# Patient Record
Sex: FEMALE | Race: WHITE | NOT HISPANIC OR LATINO | Employment: UNEMPLOYED | URBAN - METROPOLITAN AREA
[De-identification: names, ages, dates, MRNs, and addresses within clinical notes are randomized per-mention and may not be internally consistent; named-entity substitution may affect disease eponyms.]

---

## 2017-01-16 ENCOUNTER — OFFICE VISIT (OUTPATIENT)
Dept: MIDWIFE SERVICES | Facility: CLINIC | Age: 42
End: 2017-01-16
Payer: COMMERCIAL

## 2017-01-16 VITALS
OXYGEN SATURATION: 99 % | WEIGHT: 188.1 LBS | DIASTOLIC BLOOD PRESSURE: 82 MMHG | HEART RATE: 88 BPM | TEMPERATURE: 97.3 F | BODY MASS INDEX: 30.23 KG/M2 | HEIGHT: 66 IN | SYSTOLIC BLOOD PRESSURE: 118 MMHG

## 2017-01-16 DIAGNOSIS — E03.4 HYPOTHYROIDISM DUE TO ACQUIRED ATROPHY OF THYROID: ICD-10-CM

## 2017-01-16 DIAGNOSIS — Z01.419 ENCOUNTER FOR GYNECOLOGICAL EXAMINATION WITHOUT ABNORMAL FINDING: Primary | ICD-10-CM

## 2017-01-16 DIAGNOSIS — Z12.31 ENCOUNTER FOR SCREENING MAMMOGRAM FOR BREAST CANCER: ICD-10-CM

## 2017-01-16 PROCEDURE — 83718 ASSAY OF LIPOPROTEIN: CPT | Performed by: ADVANCED PRACTICE MIDWIFE

## 2017-01-16 PROCEDURE — 87624 HPV HI-RISK TYP POOLED RSLT: CPT | Performed by: ADVANCED PRACTICE MIDWIFE

## 2017-01-16 PROCEDURE — 82465 ASSAY BLD/SERUM CHOLESTEROL: CPT | Performed by: ADVANCED PRACTICE MIDWIFE

## 2017-01-16 PROCEDURE — 99396 PREV VISIT EST AGE 40-64: CPT | Performed by: ADVANCED PRACTICE MIDWIFE

## 2017-01-16 PROCEDURE — 82306 VITAMIN D 25 HYDROXY: CPT | Performed by: ADVANCED PRACTICE MIDWIFE

## 2017-01-16 PROCEDURE — G0145 SCR C/V CYTO,THINLAYER,RESCR: HCPCS | Performed by: ADVANCED PRACTICE MIDWIFE

## 2017-01-16 PROCEDURE — 36415 COLL VENOUS BLD VENIPUNCTURE: CPT | Performed by: ADVANCED PRACTICE MIDWIFE

## 2017-01-16 RX ORDER — LEVOTHYROXINE SODIUM 150 UG/1
150 TABLET ORAL DAILY
Qty: 90 TABLET | Refills: 3 | Status: SHIPPED | OUTPATIENT
Start: 2017-01-16 | End: 2017-12-19

## 2017-01-16 NOTE — NURSING NOTE
"Chief Complaint   Patient presents with     Physical       Initial /82 mmHg  Pulse 88  Temp(Src) 97.3  F (36.3  C) (Oral)  Ht 5' 5.5\" (1.664 m)  Wt 188 lb 1.6 oz (85.322 kg)  BMI 30.81 kg/m2  SpO2 99%  LMP 2017  Breastfeeding? No Estimated body mass index is 30.81 kg/(m^2) as calculated from the following:    Height as of this encounter: 5' 5.5\" (1.664 m).    Weight as of this encounter: 188 lb 1.6 oz (85.322 kg).  BP completed using cuff size: regular        The following HM Due: mammogram  pap smear      The following patient reported/Care Every where data was sent to:  P ABSTRACT QUALITY INITIATIVES [68009]  n/a     patient has appointment for today and orders have been placed               "

## 2017-01-16 NOTE — Clinical Note
[place] [id number] [place] [address]  [phone]      January 24, 2017    Mariah Perera  [facility] [place] [address]    Dear Mariah,  We are happy to inform you that your PAP smear result from 1/16/17 is normal.  We are now able to do a follow up test on PAP smears. The DNA test is for HPV (Human Papilloma Virus). Cervical cancer is closely linked with certain types of HPV. Your result showed no evidence of high risk HPV.  Therefore we recommend you return in 3 years for your next pap smear and HPV test.  You will still need to return to the clinic every year for an annual exam and other preventive tests.  Please contact the clinic with any questions.  Sincerely,  DICKSON Orellana CNM/hung

## 2017-01-16 NOTE — PROGRESS NOTES
"Mariah is a 41 year old  female who presents for annual exam.     Menses are {GYN PERIOD REGULARITY:715} and {MENSES DESCRIPTION:944328} lasting {1-10:209671} days.  Menses flow: {MENSTRUAL FLOW:0279823::\"normal\"}.  No LMP recorded. Patient is not currently having periods (Reason: Breast Feeding).. Using {CONTRACEPTION:847900} for contraception.  She {IS NOT/IS:467021::\"is not\"} currently considering pregnancy.  Besides routine health maintenance, {NO OTHER:136443}.  GYNECOLOGIC HISTORY:  Menarche: ***  {AGE AT FIRST/LIFETIME PARTNERS:380853}  Mariah {isnot:726886::\"is\"} sexually active with ***male partner(s) and {isnot:770013::\"is\"} currently in monogamous relationship with ***.    History sexually transmitted infections:{STD AGENTS:476652::\"No STD history\"}  STI testing offered?  {GC/CHLAMYDIA:757378}  ARABELLA exposure: {YES/NO/UNKNOWN:240933}  History of abnormal Pap smear: {PAP HX:492622}  Family history of breast CA: {YES/NO:288014::\"No\"}  Family history of uterine/ovarian CA: {YES/NO:421481::\"No\"}    Family history of colon CA: {YES/NO:383187::\"No\"}    HEALTH MAINTENANCE:  Cholesterol: (  CHOLESTEROL   Date Value Ref Range Status   2012 190 0 - 200 mg/dL Final     Comment:     LDL Cholesterol is the primary guide to therapy.   The NCEP recommends further evaluation of: patients with cholesterol greater   than 200 mg/dL if additional risk factors are present, cholesterol greater   than   240 mg/dL, triglycerides greater than 150 mg/dL, or HDL less than 40 mg/dL.   2011 163 0 - 200 mg/dL Final     Comment:     LDL Cholesterol is the primary guide to therapy.   The NCEP recommends further evaluation of: patients with cholesterol <200   mg/dL   if additional risk factors are present, cholesterol >240 mg/dL, triglycerides   >150 mg/dL, or HDL <40 mg/dL.    History of abnormal lipids: {Yes/No:175990250}  Mammo: *** . History of abnormal Mammo: {YES CAPS/NO SMALL:240795::\"***\"}.  Regular Self Breast " Exams: {Yes/No:800196658}  Calcium/Vitamin D intake: source:  {CALCIUM SOURCES:313193} Adequate? {Yes/No:616604428}  TSH: (  TSH   Date Value Ref Range Status   2016 1.62 0.40 - 4.00 mU/L Final    )  Pap; (PAP      NIL   2013  PAP      NIL   2012  PAP      NIL   2011 )    HISTORY:  Obstetric History       T2      TAB0   SAB0   E0   M0   L2       # Outcome Date GA Lbr Curry/2nd Weight Sex Delivery Anes PTL Lv   2 Term 10/11/13 42w1d 01:45 / 00:28 10 lb (4.536 kg) M Vag-Spont Local N Y      Name: Manav Palomo      Apgar1:  7                Apgar5: 9   1 Term 06 41w0d  9 lb 9 oz (4.338 kg) M    Y      Name: Albert        Past Medical History   Diagnosis Date     Hypothyroid      Papanicolaou smear of cervix with low grade squamous intraepithelial lesion (LGSIL) , '10     colp JOSE I, NIL     Past Surgical History   Procedure Laterality Date     C nonspecific procedure       removal of cyst from foot     Hsg  2010     normal     Radioactive iodine       Family History   Problem Relation Age of Onset     C.A.D. Paternal Grandfather      MI     Hypertension Maternal Grandfather      CEREBROVASCULAR DISEASE Maternal Grandfather      C.A.D. Maternal Grandfather      MI     Hypertension Mother      Genitourinary Problems Mother      Kidney stones     Genitourinary Problems Sister      kidney stones     DIABETES Maternal Grandmother      DIABETES Paternal Grandfather      Social History     Social History     Marital Status:      Spouse Name: N/A     Number of Children: 1     Years of Education: N/A     Occupational History      None      Social History Main Topics     Smoking status: Never Smoker      Smokeless tobacco: Never Used     Alcohol Use: No      Comment: very rarely     Drug Use: No     Sexual Activity:     Partners: Male     Other Topics Concern     Parent/Sibling W/ Cabg, Mi Or Angioplasty Before 65f 55m? No     Social History Narrative    Caffeine  intake/servings daily - 1-2 of tea/day    Calcium intake/servings daily - 2    Exercise 4 times weekly - describe yoga, pilates    Sunscreen used - Yes    Seatbelts used - Yes    Guns stored in the home - No    Self Breast Exam - Yes    Pap test up to date -  Yes    Eye exam up to date -  No    Dental exam up to date -  Yes    DEXA scan up to date -  Not Applicable    Flex Sig/Colonoscopy up to date -  Not Applicable    Mammography up to date -  Not Applicable    Immunizations reviewed and up to date - Yes    Abuse: Current or Past (Physical, Sexual or Emotional) - No    Do you feel safe in your environment - Yes    Do you cope well with stress - Yes    Do you suffer from insomnia - Yes    Last updated by: RIKKI GARCIA  2/11/2013                               Current outpatient prescriptions:      levothyroxine (SYNTHROID, LEVOTHROID) 150 MCG tablet, Take 1 tablet (150 mcg) by mouth daily, Disp: 90 tablet, Rfl: 1     PRENATAL VITAMINS PO, Take 1 tablet by mouth daily., Disp: , Rfl:      Allergies   Allergen Reactions     Methimazole Itching       Past medical, surgical, social and family history were reviewed and updated in EPIC.    ROS:   C:     NEGATIVE for fever, chills, change in weight  I:       NEGATIVE for worrisome rashes, moles or lesions  E:     NEGATIVE for vision changes or irritation  E/M: NEGATIVE for ear, mouth and throat problems  R:     NEGATIVE for significant cough or SOB  CV:   NEGATIVE for chest pain, palpitations or peripheral edema  GI:     NEGATIVE for nausea, abdominal pain, heartburn, or change in bowel habits  :   NEGATIVE for frequency, dysuria, hematuria, vaginal discharge, or irregular bleeding  M:     NEGATIVE for significant arthralgias or myalgia  N:      NEGATIVE for weakness, dizziness or paresthesias  E:      NEGATIVE for temperature intolerance, skin/hair changes  P:      NEGATIVE for changes in mood or affect.    EXAM:  There were no vitals taken for this visit.   BMI:  "There is no weight on file to calculate BMI.  Constitutional: healthy, alert and no distress  Head: Normocephalic. No masses, lesions, tenderness or abnormalities  Neck: Neck supple. Trachea midline. No adenopathy. Thyroid symmetric, normal size.   Cardiovascular: RRR.   Respiratory: Negative.   Breast: {GYN Breast:974851}  Gastrointestinal: Abdomen soft, non-tender, non-distended. No masses, organomegaly.  :  Vulva:  No external lesions, normal female hair distribution, no inguinal adenopathy.    Urethra:  Midline, non-tender, well supported, no discharge  Vagina:  Moist, pink, no abnormal discharge, no lesions  Uterus:  Normal size, *** , non-tender, freely mobile  Ovaries:  No masses appreciated, non-tender, mobile  Rectal Exam: {RECTAL EXAM:773295::\"deferred\"}  Musculoskeletal: extremities normal  Skin: no suspicious lesions or rashes  Psychiatric: Affect appropriate, cooperative,mentation appears normal.     COUNSELING:   {FEMALE COUNSELING MESSAGES:489053::\"Reviewed preventive health counseling, as reflected in patient instructions\"}   reports that she has never smoked. She has never used smokeless tobacco.  {Tobacco Cessation -- Delete if patient is a non-smoker:482849}  There is no weight on file to calculate BMI.  {Obesity Action Plan -- Delete if BMA < 25:710517}  FRAX Risk Assessment    ASSESSMENT:  41 year old female with satisfactory annual exam  {DIAG PICKLIST:478717}      "

## 2017-01-16 NOTE — PROGRESS NOTES
Mariah is a 41 year old  female who presents for annual exam.  Has 3 y/o and 10 y/o  to a , is a homemaker, they met and travel to [facility] often.    Menses are regular q 28-30 days and normal and crampy lasting 4 days.  Menses flow: medium, heavy and with clots.  Patient's last menstrual period was 2017.. Using vasectomy for contraception.  She is not currently considering pregnancy.  Besides routine health maintenance,  she would like to discuss supplements, mammo questions.  GYNECOLOGIC HISTORY:  Menarche: 13  Age at first intercourse: 18 Number of lifetime partners: less than 6  Mariah is sexually active with male partner(s) and is currently in monogamous relationship with .    History sexually transmitted infections:Herpes  STI testing offered?  Declined  ARABELLA exposure: No  History of abnormal Pap smear: YES - updated in Problem List and Health Maintenance accordingly  Family history of breast CA: No  Family history of uterine/ovarian CA: No    Family history of colon CA: No    HEALTH MAINTENANCE:  Cholesterol: (  CHOLESTEROL   Date Value Ref Range Status   2012 190 0 - 200 mg/dL Final     Comment:     LDL Cholesterol is the primary guide to therapy.   The NCEP recommends further evaluation of: patients with cholesterol greater   than 200 mg/dL if additional risk factors are present, cholesterol greater   than   240 mg/dL, triglycerides greater than 150 mg/dL, or HDL less than 40 mg/dL.   2011 163 0 - 200 mg/dL Final     Comment:     LDL Cholesterol is the primary guide to therapy.   The NCEP recommends further evaluation of: patients with cholesterol <200   mg/dL   if additional risk factors are present, cholesterol >240 mg/dL, triglycerides   >150 mg/dL, or HDL <40 mg/dL.    History of abnormal lipids: No  Mammo: n/a . History of abnormal Mammo: Not applicable.  Regular Self Breast Exams: Yes  Calcium/Vitamin D intake: source:  dairy, green leafy veggies Adequate?  No  TSH: (  TSH   Date Value Ref Range Status   2016 1.62 0.40 - 4.00 mU/L Final    )  Pap; (PAP      NIL   2013  PAP      NIL   2012  PAP      NIL   2011 )    HISTORY:  Obstetric History       T2      TAB0   SAB0   E0   M0   L2       # Outcome Date GA Lbr Curry/2nd Weight Sex Delivery Anes PTL Lv   2 Term 10/11/13 42w1d 01:45 / 00:28 10 lb (4.536 kg) M Vag-Spont Local N Y      Name: Manav Palomo      Apgar1:  7                Apgar5: 9   1 Term 06 41w0d  9 lb 9 oz (4.338 kg) M    Y      Name: Albert        Past Medical History   Diagnosis Date     Hypothyroid      Papanicolaou smear of cervix with low grade squamous intraepithelial lesion (LGSIL) , '10     colp JOSE I, NIL     Past Surgical History   Procedure Laterality Date     C nonspecific procedure       removal of cyst from foot     Hsg  2010     normal     Radioactive iodine       Family History   Problem Relation Age of Onset     C.A.D. Paternal Grandfather      MI     Hypertension Maternal Grandfather      CEREBROVASCULAR DISEASE Maternal Grandfather      C.A.D. Maternal Grandfather      MI     Hypertension Mother      Genitourinary Problems Mother      Kidney stones     Genitourinary Problems Sister      kidney stones     DIABETES Maternal Grandmother      DIABETES Paternal Grandfather      Coronary Artery Disease Mother      3 stints placed     Genitourinary Problems Father      Social History     Social History     Marital Status:      Spouse Name: N/A     Number of Children: 1     Years of Education: N/A     Occupational History      None      Social History Main Topics     Smoking status: Never Smoker      Smokeless tobacco: Never Used     Alcohol Use: 0.0 oz/week     0 Standard drinks or equivalent per week      Comment: very rarely     Drug Use: No     Sexual Activity:     Partners: Male     Birth Control/ Protection: Male Surgical     Other Topics Concern     Parent/Sibling W/ Cabg, Mi  Or Angioplasty Before 65f 55m? No     Social History Narrative    Caffeine intake/servings daily - 1-2 of tea/day    Calcium intake/servings daily - 2    Exercise 4 times weekly - describe yoga, pilates    Sunscreen used - Yes    Seatbelts used - Yes    Guns stored in the home - No    Self Breast Exam - Yes    Pap test up to date -  Yes    Eye exam up to date -  No    Dental exam up to date -  Yes    DEXA scan up to date -  Not Applicable    Flex Sig/Colonoscopy up to date -  Not Applicable    Mammography up to date -  Not Applicable    Immunizations reviewed and up to date - Yes    Abuse: Current or Past (Physical, Sexual or Emotional) - No    Do you feel safe in your environment - Yes    Do you cope well with stress - Yes    Do you suffer from insomnia - Yes    Last updated by: RIKKI GARCIA  2/11/2013                               Current outpatient prescriptions:      levothyroxine (SYNTHROID, LEVOTHROID) 150 MCG tablet, Take 1 tablet (150 mcg) by mouth daily, Disp: 90 tablet, Rfl: 1     Allergies   Allergen Reactions     Methimazole Itching       Past medical, surgical, social and family history were reviewed and updated in EPIC.    ROS:   C:     NEGATIVE for fever, chills, change in weight  I:       NEGATIVE for worrisome rashes, moles or lesions  E:     NEGATIVE for vision changes or irritation  E/M: NEGATIVE for ear, mouth and throat problems  R:     NEGATIVE for significant cough or SOB  CV:   NEGATIVE for chest pain, palpitations or peripheral edema  GI:     NEGATIVE for nausea, abdominal pain, heartburn, or change in bowel habits  :   NEGATIVE for frequency, dysuria, hematuria, vaginal discharge, or irregular bleeding  M:     NEGATIVE for significant arthralgias or myalgia  N:      NEGATIVE for weakness, dizziness or paresthesias  E:      NEGATIVE for temperature intolerance, skin/hair changes  P:      NEGATIVE for changes in mood or affect.    EXAM:  /82 mmHg  Pulse 88  Temp(Src) 97.3  F  "(36.3  C) (Oral)  Ht 5' 5.5\" (1.664 m)  Wt 188 lb 1.6 oz (85.322 kg)  BMI 30.81 kg/m2  SpO2 99%  LMP 01/07/2017  Breastfeeding? No   BMI: Body mass index is 30.81 kg/(m^2).  Constitutional: healthy, alert and no distress  Head: Normocephalic. No masses, lesions, tenderness or abnormalities  Neck: Neck supple. Trachea midline. No adenopathy. Thyroid symmetric, normal size.   Cardiovascular: RRR.   Respiratory: Negative.   Breast: Breasts reveal mild symmetric fibrocystic densities, but there are no dominant, discrete, fixed or suspicious masses found.  Gastrointestinal: Abdomen soft, non-tender, non-distended. No masses, organomegaly.  :  Vulva:  No external lesions, normal female hair distribution, no inguinal adenopathy.    Urethra:  Midline, non-tender, well supported, no discharge  Vagina:  Moist, pink, no abnormal discharge, no lesions  Uterus:  Normal size, antverted , non-tender, freely mobile  Ovaries:  No masses appreciated, non-tender, mobile  Rectal Exam: deferred  Musculoskeletal: extremities normal  Skin: no suspicious lesions or rashes  Psychiatric: Affect appropriate, cooperative,mentation appears normal.     COUNSELING:   Reviewed preventive health counseling, as reflected in patient instructions       mindfulness       Regular exercise       Healthy diet/nutrition   reports that she has never smoked. She has never used smokeless tobacco.    Body mass index is 30.81 kg/(m^2).    FRAX Risk Assessment    ASSESSMENT:  41 year old female with satisfactory annual exam  (Z01.419) Encounter for gynecological examination without abnormal finding  (primary encounter diagnosis)  Comment:   Plan: HPV High Risk Types DNA Cervical, Pap imaged         thin layer screen with HPV - recommended age 30        - 65 years (select HPV order below), Vitamin D         Deficiency, Cholesterol, HDL cholesterol            (Z12.31) Encounter for screening mammogram for breast cancer  Comment:   Plan: MA Screening " Digital Bilateral            (E03.4) Hypothyroidism due to acquired atrophy of thyroid  Comment:   Plan: levothyroxine (SYNTHROID/LEVOTHROID) 150 MCG         tablet            Just stopped breastfeeding, advised mammo in 6 months.  Had thyroid ablated, taking 150 mgc synthroid daily, would advise without symptoms to have checked every year, otherwise with symptoms.  Had checked in Dec 2016 and was wnl.      RTC 1 yr for annual, sooner with concerns.    Becky Alarcon CNM

## 2017-01-17 LAB
CHOLEST SERPL-MCNC: 185 MG/DL
DEPRECATED CALCIDIOL+CALCIFEROL SERPL-MC: 20 UG/L (ref 20–75)
HDLC SERPL-MCNC: 59 MG/DL

## 2017-01-18 LAB
COPATH REPORT: NORMAL
PAP: NORMAL

## 2017-01-20 LAB
FINAL DIAGNOSIS: NORMAL
HPV HR 12 DNA CVX QL NAA+PROBE: NEGATIVE
HPV16 DNA SPEC QL NAA+PROBE: NEGATIVE
HPV18 DNA SPEC QL NAA+PROBE: NEGATIVE
SPECIMEN DESCRIPTION: NORMAL

## 2017-07-07 ENCOUNTER — TELEPHONE (OUTPATIENT)
Dept: MIDWIFE SERVICES | Facility: CLINIC | Age: 42
End: 2017-07-07

## 2017-07-07 DIAGNOSIS — N64.4 BREAST PAIN, LEFT: Primary | ICD-10-CM

## 2017-07-07 NOTE — TELEPHONE ENCOUNTER
Patient calling regarding order for mammogram. Call to schedule, but did note left sided breast pain so needs diagnostic mammogram order. I have it teed up.Please sign.     Of note, patient does not have any lumps, bumps, redness or nipple discharge.  Mirela Ordaz

## 2017-11-01 ENCOUNTER — ALLIED HEALTH/NURSE VISIT (OUTPATIENT)
Dept: NURSING | Facility: CLINIC | Age: 42
End: 2017-11-01
Payer: COMMERCIAL

## 2017-11-01 DIAGNOSIS — Z23 NEED FOR PROPHYLACTIC VACCINATION AND INOCULATION AGAINST INFLUENZA: Primary | ICD-10-CM

## 2017-11-01 PROCEDURE — 90686 IIV4 VACC NO PRSV 0.5 ML IM: CPT

## 2017-11-01 PROCEDURE — 90471 IMMUNIZATION ADMIN: CPT

## 2017-11-01 PROCEDURE — 99207 ZZC NO CHARGE NURSE ONLY: CPT

## 2017-11-01 NOTE — PROGRESS NOTES

## 2017-11-01 NOTE — MR AVS SNAPSHOT
After Visit Summary   11/1/2017    Mariah Perera    MRN: [id number]           Patient Information     Date Of Birth          1975        Visit Information        Provider Department      11/1/2017 11:00 AM [facility] MEDICAL ASSISTANT [facility]        Today's Diagnoses     Need for prophylactic vaccination and inoculation against influenza    -  1       Follow-ups after your visit        Who to contact     If you have questions or need follow up information about today's clinic visit or your schedule please contact [facility] directly at [phone].  Normal or non-critical lab and imaging results will be communicated to you by ????hart, letter or phone within 4 business days after the clinic has received the results. If you do not hear from us within 7 days, please contact the clinic through [facility] or phone. If you have a critical or abnormal lab result, we will notify you by phone as soon as possible.  Submit refill requests through [facility] or call your pharmacy and they will forward the refill request to us. Please allow 3 business days for your refill to be completed.          Additional Information About Your Visit        MyChart Information     [facility] gives you secure access to your electronic health record. If you see a primary care provider, you can also send messages to your care team and make appointments. If you have questions, please call your primary care clinic.  If you do not have a primary care provider, please call [phone] and they will assist you.        Care EveryWhere ID     This is your Care EveryWhere ID. This could be used by other organizations to access your [facility] medical records  [id number]         Blood Pressure from Last 3 Encounters:   01/16/17 118/82   12/01/15 106/62   03/02/15 115/70    Weight from Last 3 Encounters:   01/16/17 188 lb 1.6 oz (85.3 kg)   12/01/15 179 lb (81.2 kg)   03/02/15 171 lb 11.2 oz (77.9  kg)              We Performed the Following     FLU VAC, SPLIT VIRUS IM > 3 YO (QUADRIVALENT) [42311]     Vaccine Administration, Initial [[id number]]        Primary Care Provider Office Phone # Fax #    Liban Cunningham Thien Zhou [phone] [address]  [facility] [address]        Equal Access to Services     [facility]: Hadii aad ku hadasho Soomaali, waaxda luqadaha, qaybta kaalmada adeegyada, waxay idiin hayaan adeeg kharajody lamauricion . So [facility] [phone].    ATENCIÓN: Si habla español, tiene a canela disposición servicios gratuitos de asistencia lingüística. Rochelle al [phone].    We comply with applicable federal civil rights laws and [place] laws. We do not discriminate on the basis of race, color, national origin, age, disability, sex, sexual orientation, or gender identity.            Thank you!     Thank you for choosing [facility]  for your care. Our goal is always to provide you with excellent care. Hearing back from our patients is one way we can continue to improve our services. Please take a few minutes to complete the written survey that you may receive in the mail after your visit with us. Thank you!             Your Updated Medication List - Protect others around you: Learn how to safely use, store and throw away your medicines at www.disposemymeds.org.          This list is accurate as of: 11/1/17 12:03 PM.  Always use your most recent med list.                   Brand Name Dispense Instructions for use Diagnosis    levothyroxine 150 MCG tablet    SYNTHROID/LEVOTHROID    90 tablet    Take 1 tablet (150 mcg) by mouth daily    Hypothyroidism due to acquired atrophy of thyroid

## 2017-12-19 DIAGNOSIS — E03.4 HYPOTHYROIDISM DUE TO ACQUIRED ATROPHY OF THYROID: ICD-10-CM

## 2017-12-19 RX ORDER — LEVOTHYROXINE SODIUM 150 UG/1
150 TABLET ORAL DAILY
Qty: 90 TABLET | Refills: 0 | Status: SHIPPED | OUTPATIENT
Start: 2017-12-19 | End: 2018-03-14

## 2018-03-14 ENCOUNTER — OFFICE VISIT (OUTPATIENT)
Dept: MIDWIFE SERVICES | Facility: CLINIC | Age: 43
End: 2018-03-14
Payer: COMMERCIAL

## 2018-03-14 VITALS
HEIGHT: 66 IN | SYSTOLIC BLOOD PRESSURE: 128 MMHG | BODY MASS INDEX: 29.36 KG/M2 | OXYGEN SATURATION: 99 % | DIASTOLIC BLOOD PRESSURE: 76 MMHG | HEART RATE: 88 BPM | TEMPERATURE: 97.5 F | WEIGHT: 182.7 LBS

## 2018-03-14 DIAGNOSIS — E03.4 HYPOTHYROIDISM DUE TO ACQUIRED ATROPHY OF THYROID: ICD-10-CM

## 2018-03-14 DIAGNOSIS — Z01.419 ENCOUNTER FOR WELL WOMAN EXAM WITH ROUTINE GYNECOLOGICAL EXAM: Primary | ICD-10-CM

## 2018-03-14 PROCEDURE — 36415 COLL VENOUS BLD VENIPUNCTURE: CPT | Performed by: ADVANCED PRACTICE MIDWIFE

## 2018-03-14 PROCEDURE — 84443 ASSAY THYROID STIM HORMONE: CPT | Performed by: ADVANCED PRACTICE MIDWIFE

## 2018-03-14 PROCEDURE — 99396 PREV VISIT EST AGE 40-64: CPT | Performed by: ADVANCED PRACTICE MIDWIFE

## 2018-03-14 RX ORDER — LEVOTHYROXINE SODIUM 150 UG/1
150 TABLET ORAL DAILY
Qty: 90 TABLET | Refills: 3 | Status: SHIPPED | OUTPATIENT
Start: 2018-03-14 | End: 2018-03-23

## 2018-03-14 NOTE — PROGRESS NOTES
"Chief Complaint   Patient presents with     Physical       Initial /76 (BP Location: Left arm, Patient Position: Sitting, Cuff Size: Adult Regular)  Pulse 88  Temp 97.5  F (36.4  C) (Oral)  Ht 5' 5.75\" (1.67 m)  Wt 182 lb 11.2 oz (82.9 kg)  LMP 2018 (Approximate)  SpO2 99%  Breastfeeding? No  BMI 29.71 kg/m2 Estimated body mass index is 29.71 kg/(m^2) as calculated from the following:    Height as of this encounter: 5' 5.75\" (1.67 m).    Weight as of this encounter: 182 lb 11.2 oz (82.9 kg).  BP completed using cuff size: regular        The following HM Due: NONE      The following patient reported/Care Every where data was sent to:  P ABSTRACT QUALITY INITIATIVES [73543]  n/a      n/a and patient has appointment for today            Mariah is a 43 year old  female who presents for annual exam.     Menses are regular q 28-30 days and breast tenderness prior lasting 5 days.  Menses flow: light, heavy and with clots.  Patient's last menstrual period was 2018 (approximate).. Using vasectomy for contraception.  She is not currently considering pregnancy.  Besides routine health maintenance,  she would like to discuss refills, spot on stomach that feels like a hole in muscle wall, and check a mole.  GYNECOLOGIC HISTORY:  Menarche: 13  Age at first intercourse: 18 Number of lifetime partners: less than 6  Mariah is sexually active with male partner(s) and is currently in monogamous relationship with .    History sexually transmitted infections: Herpes  STI testing offered?  Declined  ARABELLA exposure: No  History of abnormal Pap smear: YES - updated in Problem List and Health Maintenance accordingly  Family history of breast CA: No  Family history of uterine/ovarian CA: No    Family history of colon CA: No    HEALTH MAINTENANCE:  Cholesterol: (  Cholesterol   Date Value Ref Range Status   2017 185 <200 mg/dL Final   2012 190 0 - 200 mg/dL Final     Comment:     LDL " Cholesterol is the primary guide to therapy.   The NCEP recommends further evaluation of: patients with cholesterol greater   than 200 mg/dL if additional risk factors are present, cholesterol greater   than   240 mg/dL, triglycerides greater than 150 mg/dL, or HDL less than 40 mg/dL.    History of abnormal lipids: No  Mammo: 2017 . History of abnormal Mammo: No.  Regular Self Breast Exams: Yes  Calcium/Vitamin D intake: source:  dairy, green leafy veggies Adequate? No  TSH: (  TSH   Date Value Ref Range Status   2016 1.62 0.40 - 4.00 mU/L Final    )  Pap; (  Lab Results   Component Value Date    PAP NIL 2017    PAP NIL 2013    PAP NIL 2012    )    HISTORY:  Obstetric History       T2      L2     SAB0   TAB0   Ectopic0   Multiple0   Live Births2       # Outcome Date GA Lbr Curry/2nd Weight Sex Delivery Anes PTL Lv   2 Term 10/11/13 42w1d 01:45 / 00:28 10 lb (4.536 kg) M Vag-Spont Local N JACEK      Name: Manav Palomo      Apgar1:  7                Apgar5: 9   1 Term 06 41w0d  9 lb 9 oz (4.338 kg) M    JACEK      Name: Albert        Past Medical History:   Diagnosis Date     Hx of colposcopy with cervical biopsy ,     Hypothyroid      Papanicolaou smear of cervix with low grade squamous intraepithelial lesion (LGSIL) '09, '10    colp JOSE I, NIL     Past Surgical History:   Procedure Laterality Date     C NONSPECIFIC PROCEDURE      removal of cyst from foot     HSG  2010    normal     radioactive iodine       Family History   Problem Relation Age of Onset     Hypertension Mother      Genitourinary Problems Mother      Kidney stones     Coronary Artery Disease Mother      3 stints placed     Depression Mother      CEREBROVASCULAR DISEASE Mother      Genitourinary Problems Father      Thyroid Disease Father      DIABETES Maternal Grandmother      Dementia Maternal Grandmother      Hypertension Maternal Grandfather      CEREBROVASCULAR DISEASE Maternal  Grandfather      JANELLE.A.D. Maternal Grandfather      MI     C.A.D. Paternal Grandfather      MI     DIABETES Paternal Grandfather      Genitourinary Problems Sister      kidney stones     Social History     Social History     Marital status:      Spouse name: N/A     Number of children: 1     Years of education: N/A     Occupational History      None      Social History Main Topics     Smoking status: Never Smoker     Smokeless tobacco: Never Used     Alcohol use 0.0 oz/week     0 Standard drinks or equivalent per week      Comment: very rarely     Drug use: No     Sexual activity: Yes     Partners: Male     Birth control/ protection: Male Surgical     Other Topics Concern     Parent/Sibling W/ Cabg, Mi Or Angioplasty Before 65f 55m? No     Social History Narrative    Caffeine intake/servings daily - 1-2 of tea/day    Calcium intake/servings daily - 2    Exercise 4 times weekly - describe yoga, pilates    Sunscreen used - Yes    Seatbelts used - Yes    Guns stored in the home - No    Self Breast Exam - Yes    Pap test up to date -  Yes    Eye exam up to date -  No    Dental exam up to date -  Yes    DEXA scan up to date -  Not Applicable    Flex Sig/Colonoscopy up to date -  Not Applicable    Mammography up to date -  Not Applicable    Immunizations reviewed and up to date - Yes    Abuse: Current or Past (Physical, Sexual or Emotional) - No    Do you feel safe in your environment - Yes    Do you cope well with stress - Yes    Do you suffer from insomnia - Yes    Last updated by: RIKKI GARCIA  2/11/2013                               Current Outpatient Prescriptions:      levothyroxine (SYNTHROID/LEVOTHROID) 150 MCG tablet, Take 1 tablet (150 mcg) by mouth daily Due for annual exam 1/2018, Disp: 90 tablet, Rfl: 0     Allergies   Allergen Reactions     Methimazole Itching       Past medical, surgical, social and family history were reviewed and updated in EPIC.    ROS:   C:     NEGATIVE for fever, chills,  "change in weight  I:       NEGATIVE for worrisome rashes, moles or lesions  E:     NEGATIVE for vision changes or irritation  E/M: NEGATIVE for ear, mouth and throat problems  R:     NEGATIVE for significant cough or SOB  CV:   NEGATIVE for chest pain, palpitations or peripheral edema  GI:     NEGATIVE for nausea, abdominal pain, heartburn, or change in bowel habits  :   NEGATIVE for frequency, dysuria, hematuria, vaginal discharge, or irregular bleeding  M:     NEGATIVE for significant arthralgias or myalgia  N:      NEGATIVE for weakness, dizziness or paresthesias  E:      NEGATIVE for temperature intolerance, skin/hair changes  P:      NEGATIVE for changes in mood or affect.    EXAM:  /76 (BP Location: Left arm, Patient Position: Sitting, Cuff Size: Adult Regular)  Pulse 88  Temp 97.5  F (36.4  C) (Oral)  Ht 5' 5.75\" (1.67 m)  Wt 182 lb 11.2 oz (82.9 kg)  LMP 02/19/2018 (Approximate)  SpO2 99%  Breastfeeding? No  BMI 29.71 kg/m2   BMI: Body mass index is 29.71 kg/(m^2).  Constitutional: healthy, alert and no distress  Head: Normocephalic. No masses, lesions, tenderness or abnormalities  Neck: Neck supple. Trachea midline. No adenopathy. Thyroid symmetric, normal size.   Cardiovascular: RRR.   Respiratory: Negative.   Breast: Breasts reveal mild symmetric fibrocystic densities, but there are no dominant, discrete, fixed or suspicious masses found.  Gastrointestinal: Abdomen soft, non-tender, non-distended. No masses, organomegaly.  :  Pt declined pelvic exam  Musculoskeletal: extremities normal  Skin: no suspicious lesions or rashes  Psychiatric: Affect appropriate, cooperative,mentation appears normal.     COUNSELING:   Reviewed preventive health counseling, as reflected in patient instructions       Regular exercise       Healthy diet/nutrition   reports that she has never smoked. She has never used smokeless tobacco.    Body mass index is 29.71 kg/(m^2).    FRAX Risk " Assessment    ASSESSMENT:  43 year old female with satisfactory annual exam  (Z01.419) Encounter for well woman exam with routine gynecological exam  (primary encounter diagnosis)  Comment:   Plan:     (E03.4) Hypothyroidism due to acquired atrophy of thyroid  Comment:   Plan: levothyroxine (SYNTHROID/LEVOTHROID) 150 MCG         tablet, TSH with free T4 reflex            RTC 1 yr for annual, pap will be due 1/2020  RTC sooner with concerns    Becky Alarcon CNM

## 2018-03-14 NOTE — MR AVS SNAPSHOT
"              After Visit Summary   3/14/2018    Mariah Perera    MRN: [id number]           Patient Information     Date Of Birth          1975        Visit Information        Provider Department      3/14/2018 2:00 PM Becky Alarcon APRN CNM [facility]        Today's Diagnoses     Encounter for well woman exam with routine gynecological exam    -  1    Hypothyroidism due to acquired atrophy of thyroid           Follow-ups after your visit        Who to contact     If you have questions or need follow up information about today's clinic visit or your schedule please contact [facility] directly at [phone].  Normal or non-critical lab and imaging results will be communicated to you by SquareHookhart, letter or phone within 4 business days after the clinic has received the results. If you do not hear from us within 7 days, please contact the clinic through SquareHookhart or phone. If you have a critical or abnormal lab result, we will notify you by phone as soon as possible.  Submit refill requests through MyCoop or call your pharmacy and they will forward the refill request to us. Please allow 3 business days for your refill to be completed.          Additional Information About Your Visit        MyChart Information     MyCoop gives you secure access to your electronic health record. If you see a primary care provider, you can also send messages to your care team and make appointments. If you have questions, please call your primary care clinic.  If you do not have a primary care provider, please call [phone] and they will assist you.        Care EveryWhere ID     This is your Care EveryWhere ID. This could be used by other organizations to access your [facility] medical records  [id number]        Your Vitals Were     Pulse Temperature Height Last Period Pulse Oximetry Breastfeeding?    88 97.5  F (36.4  C) (Oral) 5' 5.75\" (1.67 m) 02/19/2018 (Approximate) 99% No    " BMI (Body Mass Index)                   29.71 kg/m2            Blood Pressure from Last 3 Encounters:   03/14/18 128/76   01/16/17 118/82   12/01/15 106/62    Weight from Last 3 Encounters:   03/14/18 182 lb 11.2 oz (82.9 kg)   01/16/17 188 lb 1.6 oz (85.3 kg)   12/01/15 179 lb (81.2 kg)              We Performed the Following     TSH with free T4 reflex          Where to get your medicines      [facility] [place] [place] - [address] [facility]  [address] [facility], [facility] [address]     Phone:  [phone]     levothyroxine 150 MCG tablet          Primary Care Provider Office Phone # Fax #    Liban Cunningham Thien Zhou [phone] [address] [facility] [address]        Equal Access to Services     KILLIAN [facility] : Hadii aad ku hadasho Sokeziaali, waaxda luqadaha, qaybta kaalmada adeegyada, rd deluna . So [facility] [phone].    ATENCIÓN: Si habla español, tiene a canela disposición servicios gratuitos de asistencia lingüística. Llame al [phone].    We comply with applicable federal civil rights laws and [place] laws. We do not discriminate on the basis of race, color, national origin, age, disability, sex, sexual orientation, or gender identity.            Thank you!     Thank you for choosing [facility]  for your care. Our goal is always to provide you with excellent care. Hearing back from our patients is one way we can continue to improve our services. Please take a few minutes to complete the written survey that you may receive in the mail after your visit with us. Thank you!             Your Updated Medication List - Protect others around you: Learn how to safely use, store and throw away your medicines at www.disposemymeds.org.          This list is accurate as of 3/14/18  2:32 PM.  Always use your most recent med list.                   Brand Name  Dispense Instructions for use Diagnosis    levothyroxine 150 MCG tablet    SYNTHROID/LEVOTHROID    90 tablet    Take 1 tablet (150 mcg) by mouth daily Due for annual exam 1/2018    Hypothyroidism due to acquired atrophy of thyroid

## 2018-03-15 LAB — TSH SERPL DL<=0.005 MIU/L-ACNC: 0.53 MU/L (ref 0.4–4)

## 2018-03-23 RX ORDER — LEVOTHYROXINE SODIUM 150 UG/1
150 TABLET ORAL DAILY
Qty: 90 TABLET | Refills: 3 | Status: SHIPPED | OUTPATIENT
Start: 2018-03-23 | End: 2020-09-14

## 2019-03-19 ENCOUNTER — OFFICE VISIT (OUTPATIENT)
Dept: MIDWIFE SERVICES | Facility: CLINIC | Age: 44
End: 2019-03-19
Payer: COMMERCIAL

## 2019-03-19 VITALS
WEIGHT: 178 LBS | DIASTOLIC BLOOD PRESSURE: 70 MMHG | BODY MASS INDEX: 28.95 KG/M2 | HEART RATE: 90 BPM | SYSTOLIC BLOOD PRESSURE: 136 MMHG

## 2019-03-19 DIAGNOSIS — E03.8 OTHER SPECIFIED HYPOTHYROIDISM: Primary | ICD-10-CM

## 2019-03-19 DIAGNOSIS — Z00.00 ANNUAL PHYSICAL EXAM: ICD-10-CM

## 2019-03-19 PROCEDURE — 36415 COLL VENOUS BLD VENIPUNCTURE: CPT | Performed by: ADVANCED PRACTICE MIDWIFE

## 2019-03-19 PROCEDURE — 99396 PREV VISIT EST AGE 40-64: CPT | Performed by: ADVANCED PRACTICE MIDWIFE

## 2019-03-19 PROCEDURE — 82306 VITAMIN D 25 HYDROXY: CPT | Performed by: ADVANCED PRACTICE MIDWIFE

## 2019-03-19 PROCEDURE — 84443 ASSAY THYROID STIM HORMONE: CPT | Performed by: ADVANCED PRACTICE MIDWIFE

## 2019-03-19 PROCEDURE — 84439 ASSAY OF FREE THYROXINE: CPT | Performed by: ADVANCED PRACTICE MIDWIFE

## 2019-03-19 RX ORDER — LEVOTHYROXINE SODIUM 150 UG/1
150 TABLET ORAL DAILY
Qty: 90 TABLET | Refills: 3 | Status: SHIPPED | OUTPATIENT
Start: 2019-03-19 | End: 2020-09-14

## 2019-03-19 NOTE — PROGRESS NOTES
Mariah is a 44 year old  female who presents for annual exam.     Menses are regular q 28-30 days and normal lasting 4 days.  Menses flow: normal and medium.  Patient's last menstrual period was 2019.. Using vasectomy for contraception.  She is not currently considering pregnancy.  Besides routine health maintenance,  she would like to discuss  Blood test.  GYNECOLOGIC HISTORY:  Menarche: 14  Mariah is sexually active with 1 male partner(s) and is currently in monogamous relationship with .    History sexually transmitted infections:No STD history  STI testing offered?  Declined  ARABELLA exposure: Unknown  History of abnormal Pap smear: NO - age 30- 65 PAP every 3 years recommended  Family history of breast CA: No  Family history of uterine/ovarian CA: No    Family history of colon CA: No    HEALTH MAINTENANCE:  Cholesterol: (  Cholesterol   Date Value Ref Range Status   2017 185 <200 mg/dL Final   2012 190 0 - 200 mg/dL Final     Comment:     LDL Cholesterol is the primary guide to therapy.   The NCEP recommends further evaluation of: patients with cholesterol greater   than 200 mg/dL if additional risk factors are present, cholesterol greater   than   240 mg/dL, triglycerides greater than 150 mg/dL, or HDL less than 40 mg/dL.    History of abnormal lipids: No    Mammo: yes . History of abnormal Mammo: No.  Regular Self Breast Exams: Yes    Current or Past (Physical, Sexual or Emotional):  No  Do you feel safe in your environment:  Yes    Calcium/Vitamin D intake: source:  dairy Adequate? No   Last TDAP?  Offered today? No    TSH: (  TSH   Date Value Ref Range Status   2018 0.53 0.40 - 4.00 mU/L Final    )  Pap; (  Lab Results   Component Value Date    PAP NIL 2017    PAP NIL 2013    PAP NIL 2012    )    HISTORY:  Obstetric History       T2      L2     SAB0   TAB0   Ectopic0   Multiple0   Live Births2       # Outcome Date GA Lbr Curry/2nd Weight Sex  Delivery Anes PTL Lv   2 Term 10/11/13 42w1d 01:45 / 00:28 4.536 kg (10 lb) M Vag-Spont Local N JACEK      Name: Manav Palomo      Apgar1:  7                Apgar5: 9   1 Term 06 41w0d  4.338 kg (9 lb 9 oz) M    JACEK      Name: Albert        Past Medical History:   Diagnosis Date     Hx of colposcopy with cervical biopsy      Hypothyroid      Papanicolaou smear of cervix with low grade squamous intraepithelial lesion (LGSIL) , '10    colp JOSE I, NIL     Past Surgical History:   Procedure Laterality Date     C NONSPECIFIC PROCEDURE      removal of cyst from foot     HSG  2010    normal     radioactive iodine       Family History   Problem Relation Age of Onset     Hypertension Mother      Genitourinary Problems Mother         Kidney stones     Coronary Artery Disease Mother         3 stints placed     Depression Mother      Cerebrovascular Disease Mother      Genitourinary Problems Father      Thyroid Disease Father      Diabetes Maternal Grandmother      Dementia Maternal Grandmother      Hypertension Maternal Grandfather      Cerebrovascular Disease Maternal Grandfather      C.A.D. Maternal Grandfather         MI     C.A.D. Paternal Grandfather         MI     Diabetes Paternal Grandfather      Genitourinary Problems Sister         kidney stones     Social History     Socioeconomic History     Marital status:      Spouse name: None     Number of children: 1     Years of education: None     Highest education level: None   Occupational History     Employer: NONE    Social Needs     Financial resource strain: None     Food insecurity:     Worry: None     Inability: None     Transportation needs:     Medical: None     Non-medical: None   Tobacco Use     Smoking status: Never Smoker     Smokeless tobacco: Never Used   Substance and Sexual Activity     Alcohol use: Yes     Alcohol/week: 0.0 oz     Comment: very rarely     Drug use: No     Sexual activity: Yes     Partners: Male      Birth control/protection: Male Surgical   Lifestyle     Physical activity:     Days per week: None     Minutes per session: None     Stress: None   Relationships     Social connections:     Talks on phone: None     Gets together: None     Attends Spiritism service: None     Active member of club or organization: None     Attends meetings of clubs or organizations: None     Relationship status: None     Intimate partner violence:     Fear of current or ex partner: None     Emotionally abused: None     Physically abused: None     Forced sexual activity: None   Other Topics Concern     Parent/sibling w/ CABG, MI or angioplasty before 65F 55M? No   Social History Narrative    Caffeine intake/servings daily - 1-2 of tea/day    Calcium intake/servings daily - 2    Exercise 4 times weekly - describe yoga, pilates    Sunscreen used - Yes    Seatbelts used - Yes    Guns stored in the home - No    Self Breast Exam - Yes    Pap test up to date -  Yes    Eye exam up to date -  No    Dental exam up to date -  Yes    DEXA scan up to date -  Not Applicable    Flex Sig/Colonoscopy up to date -  Not Applicable    Mammography up to date -  Not Applicable    Immunizations reviewed and up to date - Yes    Abuse: Current or Past (Physical, Sexual or Emotional) - No    Do you feel safe in your environment - Yes    Do you cope well with stress - Yes    Do you suffer from insomnia - Yes    Last updated by: RIKKI GARCIA  2/11/2013                               Current Outpatient Medications:      levothyroxine (SYNTHROID/LEVOTHROID) 150 MCG tablet, Take 1 tablet (150 mcg) by mouth daily, Disp: 90 tablet, Rfl: 3     Allergies   Allergen Reactions     Methimazole Itching       Past medical, surgical, social and family history were reviewed and updated in EPIC.    ROS:   C:     NEGATIVE for fever, chills, change in weight  I:       NEGATIVE for worrisome rashes, moles or lesions  E:     NEGATIVE for vision changes or irritation  E/M:  NEGATIVE for ear, mouth and throat problems  R:     NEGATIVE for significant cough or SOB  CV:   NEGATIVE for chest pain, palpitations or peripheral edema  GI:     NEGATIVE for nausea, abdominal pain, heartburn, or change in bowel habits  :   NEGATIVE for frequency, dysuria, hematuria, vaginal discharge, or irregular bleeding  M:     NEGATIVE for significant arthralgias or myalgia  N:      NEGATIVE for weakness, dizziness or paresthesias  E:      NEGATIVE for temperature intolerance, skin/hair changes  P:      NEGATIVE for changes in mood or affect.    EXAM:  /70   Pulse 90   Wt 80.7 kg (178 lb)   LMP 03/04/2019   BMI 28.95 kg/m     BMI: Body mass index is 28.95 kg/m .  Constitutional: healthy, alert and no distress  Head: Normocephalic. No masses, lesions, tenderness or abnormalities  Neck: Neck supple. Trachea midline. No adenopathy. Thyroid symmetric, normal size.   Cardiovascular: RRR.   Respiratory: Negative.   Breast: No nodularity, asymmetry or nipple discharge bilaterally.  Gastrointestinal: Abdomen soft, non-tender, non-distended. No masses, organomegaly.  :  Pelvic exam offered and declined   Musculoskeletal: extremities normal  Skin: no suspicious lesions or rashes  Psychiatric: Affect appropriate, cooperative,mentation appears normal.     COUNSELING:   Reviewed preventive health counseling, as reflected in patient instructions       Regular exercise       Healthy diet/nutrition   reports that  has never smoked. she has never used smokeless tobacco.    Body mass index is 28.95 kg/m .  Weight management plan: Discussed healthy diet and exercise guidelines  FRAX Risk Assessment    ASSESSMENT:  44 year old female with satisfactory annual exam  (E03.8) Other specified hypothyroidism  (primary encounter diagnosis)  Plan: TSH with free T4 reflex, levothyroxine         (SYNTHROID/LEVOTHROID) 150 MCG tablet  Declines mammogram     Discussed mood and stress management.  Son is taking antidepressants.   We discussed supplements supportive of mental health, such as vit D and magnesium.  We discussed self care, gratitude and doing things support a positive mood.

## 2019-03-20 ENCOUNTER — TELEPHONE (OUTPATIENT)
Dept: MIDWIFE SERVICES | Facility: CLINIC | Age: 44
End: 2019-03-20

## 2019-03-20 LAB
T4 FREE SERPL-MCNC: 1.37 NG/DL (ref 0.76–1.46)
TSH SERPL DL<=0.005 MIU/L-ACNC: 0.3 MU/L (ref 0.4–4)

## 2019-03-20 NOTE — TELEPHONE ENCOUNTER
Called and left message. TSH is low at 0.30, but T4 is normal. I consulted with Family Practice regarding the results. If she is having symptoms of hyperthyroid like anxiety, sweaty, diarrhea we could decrease the dose slightly to 125 mcg.  If she is feeling well/normal, I recommend rechecking TSH, reflex T4 in 3-6 months.  I asked her to call back with any questions or if she would like to adjust the dose.

## 2019-03-21 DIAGNOSIS — E03.2 HYPOTHYROIDISM DUE TO MEDICATION: Primary | ICD-10-CM

## 2019-03-21 LAB — DEPRECATED CALCIDIOL+CALCIFEROL SERPL-MC: 18 UG/L (ref 20–75)

## 2019-03-21 RX ORDER — LEVOTHYROXINE SODIUM 137 UG/1
137 TABLET ORAL DAILY
Qty: 90 TABLET | Refills: 1 | Status: SHIPPED | OUTPATIENT
Start: 2019-03-21 | End: 2019-08-30

## 2019-03-21 NOTE — TELEPHONE ENCOUNTER
Patient calling regarding lab work for TSH - was low at 0.3. Patient would like to adjust her dose to the 137mcg tablets if possible. I have pharmacy teed up.    Would also like to come back to recheck labs - orders teed up as well. When would you recommend she do this time rios?    Mirela Ordaz

## 2019-05-08 DIAGNOSIS — E03.2 HYPOTHYROIDISM DUE TO MEDICATION: ICD-10-CM

## 2019-05-08 LAB — TSH SERPL DL<=0.005 MIU/L-ACNC: 0.66 MU/L (ref 0.4–4)

## 2019-05-08 PROCEDURE — 36415 COLL VENOUS BLD VENIPUNCTURE: CPT | Performed by: ADVANCED PRACTICE MIDWIFE

## 2019-05-08 PROCEDURE — 84443 ASSAY THYROID STIM HORMONE: CPT | Performed by: ADVANCED PRACTICE MIDWIFE

## 2019-08-30 DIAGNOSIS — E03.2 HYPOTHYROIDISM DUE TO MEDICATION: ICD-10-CM

## 2019-08-30 RX ORDER — LEVOTHYROXINE SODIUM 137 UG/1
TABLET ORAL
Qty: 90 TABLET | Refills: 1 | Status: SHIPPED | OUTPATIENT
Start: 2019-08-30 | End: 2019-08-30

## 2019-08-30 RX ORDER — LEVOTHYROXINE SODIUM 137 UG/1
137 TABLET ORAL DAILY
Qty: 90 TABLET | Refills: 1 | Status: SHIPPED | OUTPATIENT
Start: 2019-08-30 | End: 2020-09-02

## 2019-08-30 NOTE — TELEPHONE ENCOUNTER
Refilled per request. It looks like her dose was adjusted in March of this year. As long as she isn't symptomatic I think its fine to continue this dose until we recheck at her next annual exam or sooner prn. Please notify patient. Pattie Elliott CNM

## 2019-08-30 NOTE — TELEPHONE ENCOUNTER
Pharmacy sent refill request for levothyroxine.  Pt due for AE 3/2020.  Last TSH was 5/2019 and it was in normal range.  Do you want to refill at her current dose?  Liss Issa RN

## 2019-10-29 ENCOUNTER — TELEPHONE (OUTPATIENT)
Dept: MIDWIFE SERVICES | Facility: CLINIC | Age: 44
End: 2019-10-29

## 2019-10-29 DIAGNOSIS — Z83.719 FAMILY HISTORY OF POLYPS IN THE COLON: Primary | ICD-10-CM

## 2019-10-29 NOTE — TELEPHONE ENCOUNTER
Order placed. Number to call to schedule is [phone]. Left message to call clinic.  Mirela Chance RN

## 2019-10-29 NOTE — TELEPHONE ENCOUNTER
Patient calling regarding order for colonoscopy - she stated that her younger sister was recently diagnosed with colon polyps and she was told if she had any siblings they should have a colonoscopy to be checked for them as well. Okay to order? Patient specifically asking for a message to be sent to [facility] and aware she is not in office until tomorrow.   Mirela Chance RN

## 2019-11-07 ENCOUNTER — HEALTH MAINTENANCE LETTER (OUTPATIENT)
Age: 44
End: 2019-11-07

## 2019-11-14 ENCOUNTER — TELEPHONE (OUTPATIENT)
Dept: GASTROENTEROLOGY | Facility: CLINIC | Age: 44
End: 2019-11-14

## 2019-11-14 DIAGNOSIS — Z83.719 FAMILY HISTORY OF COLONIC POLYPS: Primary | ICD-10-CM

## 2019-11-14 NOTE — TELEPHONE ENCOUNTER
Patient scheduled for Colonoscopy    Indication for procedure. Family history of polyps in the colon    Referring Provider. Bailey Evans CNM    ? No     Arrival time verified? 9:15 AM    Facility location verified? [address] [place] [address]    Instructions given regarding prep and procedure Patient declined review    Prep Type Golytely    Are you taking any anticoagulants or blood thinners? No     Instructions given? N/a     Electronic implanted devices?  No     Pre procedure teaching completed? Yes    Transportation from procedure?  policy reviewed. Instructed patient to have someone stay with her for 24 hours post exam    H&P / Pre op physical completed? N/a

## 2019-11-20 ENCOUNTER — ANESTHESIA EVENT (OUTPATIENT)
Dept: SURGERY | Facility: AMBULATORY SURGERY CENTER | Age: 44
End: 2019-11-20

## 2019-11-21 ENCOUNTER — HOSPITAL ENCOUNTER (OUTPATIENT)
Facility: AMBULATORY SURGERY CENTER | Age: 44
End: 2019-11-21
Attending: INTERNAL MEDICINE
Payer: COMMERCIAL

## 2019-11-21 ENCOUNTER — ANESTHESIA (OUTPATIENT)
Dept: SURGERY | Facility: AMBULATORY SURGERY CENTER | Age: 44
End: 2019-11-21

## 2019-11-21 VITALS
RESPIRATION RATE: 16 BRPM | BODY MASS INDEX: 28.93 KG/M2 | DIASTOLIC BLOOD PRESSURE: 70 MMHG | HEIGHT: 66 IN | OXYGEN SATURATION: 99 % | HEART RATE: 72 BPM | WEIGHT: 180 LBS | SYSTOLIC BLOOD PRESSURE: 111 MMHG | TEMPERATURE: 97.8 F

## 2019-11-21 VITALS — HEART RATE: 82 BPM

## 2019-11-21 LAB — COLONOSCOPY: NORMAL

## 2019-11-21 RX ORDER — ONDANSETRON 4 MG/1
4 TABLET, ORALLY DISINTEGRATING ORAL EVERY 30 MIN PRN
Status: DISCONTINUED | OUTPATIENT
Start: 2019-11-21 | End: 2019-11-22 | Stop reason: HOSPADM

## 2019-11-21 RX ORDER — ONDANSETRON 4 MG/1
4 TABLET, ORALLY DISINTEGRATING ORAL EVERY 6 HOURS PRN
Status: DISCONTINUED | OUTPATIENT
Start: 2019-11-21 | End: 2019-11-22 | Stop reason: HOSPADM

## 2019-11-21 RX ORDER — LIDOCAINE HYDROCHLORIDE 20 MG/ML
INJECTION, SOLUTION INFILTRATION; PERINEURAL PRN
Status: DISCONTINUED | OUTPATIENT
Start: 2019-11-21 | End: 2019-11-21

## 2019-11-21 RX ORDER — NALOXONE HYDROCHLORIDE 0.4 MG/ML
.1-.4 INJECTION, SOLUTION INTRAMUSCULAR; INTRAVENOUS; SUBCUTANEOUS
Status: DISCONTINUED | OUTPATIENT
Start: 2019-11-21 | End: 2019-11-22 | Stop reason: HOSPADM

## 2019-11-21 RX ORDER — ONDANSETRON 2 MG/ML
INJECTION INTRAMUSCULAR; INTRAVENOUS PRN
Status: DISCONTINUED | OUTPATIENT
Start: 2019-11-21 | End: 2019-11-21

## 2019-11-21 RX ORDER — SODIUM CHLORIDE, SODIUM LACTATE, POTASSIUM CHLORIDE, CALCIUM CHLORIDE 600; 310; 30; 20 MG/100ML; MG/100ML; MG/100ML; MG/100ML
INJECTION, SOLUTION INTRAVENOUS CONTINUOUS
Status: DISCONTINUED | OUTPATIENT
Start: 2019-11-21 | End: 2019-11-22 | Stop reason: HOSPADM

## 2019-11-21 RX ORDER — FLUMAZENIL 0.1 MG/ML
0.2 INJECTION, SOLUTION INTRAVENOUS
Status: DISCONTINUED | OUTPATIENT
Start: 2019-11-21 | End: 2019-11-22 | Stop reason: HOSPADM

## 2019-11-21 RX ORDER — ACETAMINOPHEN 325 MG/1
975 TABLET ORAL ONCE
Status: DISCONTINUED | OUTPATIENT
Start: 2019-11-21 | End: 2019-11-21 | Stop reason: HOSPADM

## 2019-11-21 RX ORDER — LIDOCAINE 40 MG/G
CREAM TOPICAL
Status: DISCONTINUED | OUTPATIENT
Start: 2019-11-21 | End: 2019-11-21 | Stop reason: HOSPADM

## 2019-11-21 RX ORDER — ONDANSETRON 2 MG/ML
4 INJECTION INTRAMUSCULAR; INTRAVENOUS EVERY 30 MIN PRN
Status: DISCONTINUED | OUTPATIENT
Start: 2019-11-21 | End: 2019-11-22 | Stop reason: HOSPADM

## 2019-11-21 RX ORDER — OXYCODONE HYDROCHLORIDE 5 MG/1
5 TABLET ORAL EVERY 4 HOURS PRN
Status: DISCONTINUED | OUTPATIENT
Start: 2019-11-21 | End: 2019-11-22 | Stop reason: HOSPADM

## 2019-11-21 RX ORDER — ONDANSETRON 2 MG/ML
4 INJECTION INTRAMUSCULAR; INTRAVENOUS EVERY 6 HOURS PRN
Status: DISCONTINUED | OUTPATIENT
Start: 2019-11-21 | End: 2019-11-22 | Stop reason: HOSPADM

## 2019-11-21 RX ORDER — FENTANYL CITRATE 50 UG/ML
25-50 INJECTION, SOLUTION INTRAMUSCULAR; INTRAVENOUS
Status: DISCONTINUED | OUTPATIENT
Start: 2019-11-21 | End: 2019-11-21 | Stop reason: HOSPADM

## 2019-11-21 RX ORDER — MEPERIDINE HYDROCHLORIDE 25 MG/ML
12.5 INJECTION INTRAMUSCULAR; INTRAVENOUS; SUBCUTANEOUS
Status: DISCONTINUED | OUTPATIENT
Start: 2019-11-21 | End: 2019-11-22 | Stop reason: HOSPADM

## 2019-11-21 RX ORDER — PROPOFOL 10 MG/ML
INJECTION, EMULSION INTRAVENOUS CONTINUOUS PRN
Status: DISCONTINUED | OUTPATIENT
Start: 2019-11-21 | End: 2019-11-21

## 2019-11-21 RX ORDER — GABAPENTIN 300 MG/1
300 CAPSULE ORAL ONCE
Status: DISCONTINUED | OUTPATIENT
Start: 2019-11-21 | End: 2019-11-21 | Stop reason: HOSPADM

## 2019-11-21 RX ORDER — SODIUM CHLORIDE, SODIUM LACTATE, POTASSIUM CHLORIDE, CALCIUM CHLORIDE 600; 310; 30; 20 MG/100ML; MG/100ML; MG/100ML; MG/100ML
INJECTION, SOLUTION INTRAVENOUS CONTINUOUS PRN
Status: DISCONTINUED | OUTPATIENT
Start: 2019-11-21 | End: 2019-11-21

## 2019-11-21 RX ORDER — PROPOFOL 10 MG/ML
INJECTION, EMULSION INTRAVENOUS PRN
Status: DISCONTINUED | OUTPATIENT
Start: 2019-11-21 | End: 2019-11-21

## 2019-11-21 RX ORDER — ONDANSETRON 2 MG/ML
4 INJECTION INTRAMUSCULAR; INTRAVENOUS
Status: DISCONTINUED | OUTPATIENT
Start: 2019-11-21 | End: 2019-11-21 | Stop reason: HOSPADM

## 2019-11-21 RX ADMIN — ONDANSETRON 4 MG: 2 INJECTION INTRAMUSCULAR; INTRAVENOUS at 11:42

## 2019-11-21 RX ADMIN — LIDOCAINE HYDROCHLORIDE 80 MG: 20 INJECTION, SOLUTION INFILTRATION; PERINEURAL at 11:39

## 2019-11-21 RX ADMIN — PROPOFOL 150 MCG/KG/MIN: 10 INJECTION, EMULSION INTRAVENOUS at 11:39

## 2019-11-21 RX ADMIN — SODIUM CHLORIDE, SODIUM LACTATE, POTASSIUM CHLORIDE, CALCIUM CHLORIDE: 600; 310; 30; 20 INJECTION, SOLUTION INTRAVENOUS at 11:33

## 2019-11-21 RX ADMIN — PROPOFOL 80 MG: 10 INJECTION, EMULSION INTRAVENOUS at 11:46

## 2019-11-21 RX ADMIN — PROPOFOL 80 MG: 10 INJECTION, EMULSION INTRAVENOUS at 11:57

## 2019-11-21 ASSESSMENT — MIFFLIN-ST. JEOR: SCORE: 1483.22

## 2019-11-21 NOTE — ANESTHESIA PREPROCEDURE EVALUATION
Anesthesia Pre-Procedure Evaluation    Patient: Mariah Perera   MRN:     [id number] Gender:   female   Age:    44 year old :      1975        Preoperative Diagnosis: * No pre-op diagnosis entered *   Procedure(s):  COLONOSCOPY     Past Medical History:   Diagnosis Date     Hx of colposcopy with cervical biopsy ,     Hypothyroid      Papanicolaou smear of cervix with low grade squamous intraepithelial lesion (LGSIL) , '10    colp JOSE I, NIL      Past Surgical History:   Procedure Laterality Date     C NONSPECIFIC PROCEDURE      removal of cyst from foot     HSG  2010    normal     radioactive iodine            Anesthesia Evaluation     .             ROS/MED HX    ENT/Pulmonary:  - neg pulmonary ROS     Neurologic:  - neg neurologic ROS     Cardiovascular:  - neg cardiovascular ROS       METS/Exercise Tolerance:     Hematologic:  - neg hematologic  ROS       Musculoskeletal:  - neg musculoskeletal ROS       GI/Hepatic:  - neg GI/hepatic ROS       Renal/Genitourinary:  - ROS Renal section negative       Endo:     (+) thyroid problem .      Psychiatric:  - neg psychiatric ROS       Infectious Disease:  - neg infectious disease ROS       Malignancy:      - no malignancy   Other:                         PHYSICAL EXAM:   Mental Status/Neuro: A/A/O   Airway: Facies: Feasible  Mallampati: I  Mouth/Opening: Full  TM distance: > 6 cm  Neck ROM: Full   Respiratory: Auscultation: CTAB     Resp. Rate: Normal     Resp. Effort: Normal      CV: Rhythm: Regular  Rate: Age appropriate  Heart: Normal Sounds  Edema: None   Comments:      Dental: Normal Dentition                LABS:  CBC:   Lab Results   Component Value Date    WBC 9.9 2013    WBC 8.8 2010    HGB 11.8 10/13/2013    HGB 11.6 (L) 2013    HCT 41.3 2013    HCT 40.7 2010     2013     2010     BMP:   Lab Results   Component Value Date     2010    POTASSIUM 4.0  "07/20/2010    CHLORIDE 102 07/20/2010    CO2 28 07/20/2010    BUN 13 07/20/2010    CR 0.83 07/20/2010    GLC 95 09/06/2012    GLC 91 07/20/2010     COAGS: No results found for: PTT, INR, FIBR  POC:   Lab Results   Component Value Date    HCG  08/30/2010     Negative   This test provides a presumptive diagnosis of pregnancy or non-pregnancy. A   confirmed pregnancy diagnosis should only be made by a physician after all   clinical and laboratory findings have been evaluated.     OTHER:   Lab Results   Component Value Date    ANETA 9.1 07/20/2010    ALBUMIN 4.6 07/20/2010    PROTTOTAL 8.1 07/20/2010    ALT 15 07/20/2010    AST 31 07/20/2010    ALKPHOS 60 07/20/2010    BILITOTAL 0.7 07/20/2010    TSH 0.66 05/08/2019    T4 1.37 03/19/2019    T3 29 (L) 03/23/2009        Preop Vitals    BP Readings from Last 3 Encounters:   03/19/19 136/70   03/14/18 128/76   01/16/17 118/82    Pulse Readings from Last 3 Encounters:   03/19/19 90   03/14/18 88   01/16/17 88      Resp Readings from Last 3 Encounters:   12/01/15 16   11/26/13 16   10/13/13 16    SpO2 Readings from Last 3 Encounters:   03/14/18 99%   01/16/17 99%   12/01/15 99%      Temp Readings from Last 1 Encounters:   03/14/18 36.4  C (97.5  F) (Oral)    Ht Readings from Last 1 Encounters:   03/14/18 1.67 m (5' 5.75\")      Wt Readings from Last 1 Encounters:   03/19/19 80.7 kg (178 lb)    Estimated body mass index is 28.95 kg/m  as calculated from the following:    Height as of 3/14/18: 1.67 m (5' 5.75\").    Weight as of 3/19/19: 80.7 kg (178 lb).     LDA:        Assessment:   ASA SCORE: 2    H&P: History and physical reviewed and following examination; no interval change.   Smoking Status:  Non-Smoker/Unknown   NPO Status: NPO Appropriate     Plan:   Anes. Type:  MAC   Pre-Medication: None   Induction:  IV (Standard)   Airway: Native Airway   Access/Monitoring: PIV   Maintenance: Propofol Sedation     Postop Plan:   Postop Pain: None  Postop Sedation/Airway: Not " planned  Disposition: Outpatient     PONV Management:   Adult Risk Factors: Female, Non-Smoker   Prevention: Ondansetron, Propofol     CONSENT: Direct conversation   Plan and risks discussed with: Patient                      Vasquez Alford MD

## 2019-11-21 NOTE — ANESTHESIA POSTPROCEDURE EVALUATION
Anesthesia POST Procedure Evaluation    Patient: Mariah Perera   MRN:     [id number] Gender:   female   Age:    44 year old :      1975        Preoperative Diagnosis: * No pre-op diagnosis entered *   Procedure(s):  COLONOSCOPY, WITH POLYPECTOMY AND BIOPSY   Postop Comments: No value filed.       Anesthesia Type:  Not documented  MAC    Reportable Event: NO     PAIN: Uncomplicated   Sign Out status: Comfortable, Well controlled pain     PONV: No PONV   Sign Out status:  No Nausea or Vomiting     Neuro/Psych: Uneventful perioperative course   Sign Out Status: Preoperative baseline; Age appropriate mentation     Airway/Resp.: Uneventful perioperative course   Sign Out Status: Non labored breathing, age appropriate RR; Resp. Status within EXPECTED Parameters     CV: Uneventful perioperative course   Sign Out status: Appropriate BP and perfusion indices; Appropriate HR/Rhythm     Disposition:   Sign Out in:  Phase II  Disposition:  Home  Recovery Course: Uneventful  Follow-Up: Not required           Last Anesthesia Record Vitals:  CRNA VITALS  2019 1143 - 2019 1243      2019             Pulse:  (!) 11    Ht Rate:  88          Last PACU Vitals:  Vitals Value Taken Time   /75 2019 12:18 PM   Temp 36.6  C (97.8  F) 2019 12:18 PM   Pulse 73 2019 12:18 PM   Resp 16 2019 12:18 PM   SpO2 99 % 2019 12:18 PM   Temp src     NIBP 101/55 2019 12:10 PM   Pulse 11 2019 12:12 PM   SpO2 99 % 2019 12:11 PM   Resp     Temp     Ht Rate 88 2019 12:12 PM   Temp 2           Electronically Signed By: Gutierrez Aguirre MD, 2019, 2:22 PM

## 2019-11-21 NOTE — ANESTHESIA CARE TRANSFER NOTE
Patient: Mariah Perera    Procedure(s):  COLONOSCOPY, WITH POLYPECTOMY AND BIOPSY    Diagnosis: * No pre-op diagnosis entered *  Diagnosis Additional Information: No value filed.    Anesthesia Type:   MAC     Note:  Airway :Room Air  Patient transferred to:Phase II  Comments: VSS and WNL, comfortable, no PONV, report to Kelley RNHandoff Report: Identifed the Patient, Identified the Reponsible Provider, Reviewed the pertinent medical history, Discussed the surgical course, Reviewed Intra-OP anesthesia mangement and issues during anesthesia, Set expectations for post-procedure period and Allowed opportunity for questions and acknowledgement of understanding      Vitals: (Last set prior to Anesthesia Care Transfer)    CRNA VITALS  11/21/2019 1143 - 11/21/2019 1219      11/21/2019             Pulse:  (!) 11    Ht Rate:  88                Electronically Signed By: DICKSON Holder CRNA  November 21, 2019  12:19 PM

## 2019-11-25 LAB — COPATH REPORT: NORMAL

## 2019-12-10 ENCOUNTER — TELEPHONE (OUTPATIENT)
Dept: GASTROENTEROLOGY | Facility: CLINIC | Age: 44
End: 2019-12-10

## 2019-12-10 NOTE — TELEPHONE ENCOUNTER
[facility] Call Center    Phone Message    May a detailed message be left on voicemail: yes    Reason for Call: Other: Please call pt to help her with f/u questions after colonscopy.       Action Taken: Message routed to:  [facility] ([facility]

## 2019-12-11 NOTE — TELEPHONE ENCOUNTER
Spoke with pt regarding questions about recent colonoscopy. Reviewed results written by MD. Pt verbalizes full understanding. Pt encouraged to call back with any further questions.

## 2020-01-07 NOTE — PRE-PROCEDURE
Mariah Perera  [id number]  female  44 year old      Reason for procedure/surgery: colon cancer screening    Patient Active Problem List   Diagnosis     Papanicolaou smear of vagina with low grade squamous intraepithelial lesion (LGSIL)     CARDIOVASCULAR SCREENING; LDL GOAL LESS THAN 160     Hypothyroid     Rh negative status during pregnancy       Past Surgical History:    Past Surgical History:   Procedure Laterality Date     C NONSPECIFIC PROCEDURE  2000    removal of cyst from foot     HSG  7/2010    normal     radioactive iodine  1/09       Past Medical History:   Past Medical History:   Diagnosis Date     Hx of colposcopy with cervical biopsy 2009,2010     Hypothyroid      Papanicolaou smear of cervix with low grade squamous intraepithelial lesion (LGSIL) '09, '10    colp JOSE I, NIL       Social History:   Social History     Tobacco Use     Smoking status: Never Smoker     Smokeless tobacco: Never Used   Substance Use Topics     Alcohol use: Yes     Alcohol/week: 0.0 standard drinks     Comment: very rarely       Family History:   Family History   Problem Relation Age of Onset     Hypertension Mother      Genitourinary Problems Mother         Kidney stones     Coronary Artery Disease Mother         3 stints placed     Depression Mother      Cerebrovascular Disease Mother      Genitourinary Problems Father      Thyroid Disease Father      Diabetes Maternal Grandmother      Dementia Maternal Grandmother      Hypertension Maternal Grandfather      Cerebrovascular Disease Maternal Grandfather      C.A.D. Maternal Grandfather         MI     C.A.D. Paternal Grandfather         MI     Diabetes Paternal Grandfather      Genitourinary Problems Sister         kidney stones       Allergies:   Allergies   Allergen Reactions     Methimazole Itching       Active Medications:   Current Outpatient Medications   Medication Sig Dispense Refill     levothyroxine (SYNTHROID/LEVOTHROID) 150 MCG tablet Take 1 tablet  Negative gc/chlamydia "(150 mcg) by mouth daily 90 tablet 3     levothyroxine (SYNTHROID/LEVOTHROID) 150 MCG tablet Take 1 tablet (150 mcg) by mouth daily 90 tablet 3     levothyroxine (SYNTHROID/LEVOTHROID) 137 MCG tablet Take 1 tablet (137 mcg) by mouth daily 90 tablet 1       Systemic Review:   CONSTITUTIONAL: NEGATIVE for fever, chills, change in weight  ENT/MOUTH: NEGATIVE for ear, mouth and throat problems  RESP: NEGATIVE for significant cough or SOB  CV: NEGATIVE for chest pain, palpitations or peripheral edema    Physical Examination:   Vital Signs: /61   Pulse 70   Temp 97.6  F (36.4  C) (Temporal)   Resp 18   Ht 1.676 m (5' 6\")   Wt 81.6 kg (180 lb)   SpO2 100%   BMI 29.05 kg/m    GENERAL: healthy, alert and no distress  NECK: no adenopathy, no asymmetry, masses, or scars  RESP: lungs clear to auscultation - no rales, rhonchi or wheezes  CV: regular rate and rhythm, normal S1 S2, no S3 or S4, no murmur, click or rub, no peripheral edema and peripheral pulses strong  ABDOMEN: soft, nontender, no hepatosplenomegaly, no masses and bowel sounds normal  MS: no gross musculoskeletal defects noted, no edema    Plan: Appropriate to proceed as scheduled.      Romel Shell MD  11/21/2019    PCP:  Liban Gan    "

## 2020-09-02 DIAGNOSIS — E03.2 HYPOTHYROIDISM DUE TO MEDICATION: ICD-10-CM

## 2020-09-02 RX ORDER — LEVOTHYROXINE SODIUM 137 UG/1
137 TABLET ORAL DAILY
Qty: 30 TABLET | Refills: 0 | Status: SHIPPED | OUTPATIENT
Start: 2020-09-02 | End: 2020-09-14

## 2020-09-02 NOTE — TELEPHONE ENCOUNTER
Pending Prescriptions:                       Disp   Refills    levothyroxine (SYNTHROID/LEVOTHROID) 137 *30 tab*0            Sig: Take 1 tablet (137 mcg) by mouth daily    Pharmacy sent refill request. Last OV was 3/19/19. Due for AE and thyroid level check. LMTC. Once appt scheduled, can send refill to bridge until appt.   Ayaka Garcia, RN-BSN

## 2020-09-14 ENCOUNTER — OFFICE VISIT (OUTPATIENT)
Dept: MIDWIFE SERVICES | Facility: CLINIC | Age: 45
End: 2020-09-14
Payer: COMMERCIAL

## 2020-09-14 VITALS
WEIGHT: 187.5 LBS | TEMPERATURE: 98.4 F | BODY MASS INDEX: 30.13 KG/M2 | HEIGHT: 66 IN | HEART RATE: 87 BPM | DIASTOLIC BLOOD PRESSURE: 80 MMHG | SYSTOLIC BLOOD PRESSURE: 128 MMHG

## 2020-09-14 DIAGNOSIS — Z12.4 SCREENING FOR CERVICAL CANCER: ICD-10-CM

## 2020-09-14 DIAGNOSIS — E03.2 HYPOTHYROIDISM DUE TO MEDICATION: ICD-10-CM

## 2020-09-14 DIAGNOSIS — Z01.419 WELL WOMAN EXAM WITH ROUTINE GYNECOLOGICAL EXAM: Primary | ICD-10-CM

## 2020-09-14 DIAGNOSIS — Z12.31 ENCOUNTER FOR SCREENING MAMMOGRAM FOR BREAST CANCER: ICD-10-CM

## 2020-09-14 DIAGNOSIS — Z23 NEED FOR PROPHYLACTIC VACCINATION AND INOCULATION AGAINST INFLUENZA: ICD-10-CM

## 2020-09-14 DIAGNOSIS — Z13.9 SCREENING FOR CONDITION: ICD-10-CM

## 2020-09-14 DIAGNOSIS — Z13.29 SCREENING FOR THYROID DISORDER: ICD-10-CM

## 2020-09-14 PROCEDURE — 82306 VITAMIN D 25 HYDROXY: CPT | Performed by: ADVANCED PRACTICE MIDWIFE

## 2020-09-14 PROCEDURE — G0145 SCR C/V CYTO,THINLAYER,RESCR: HCPCS

## 2020-09-14 PROCEDURE — 90471 IMMUNIZATION ADMIN: CPT | Performed by: ADVANCED PRACTICE MIDWIFE

## 2020-09-14 PROCEDURE — 90686 IIV4 VACC NO PRSV 0.5 ML IM: CPT | Performed by: ADVANCED PRACTICE MIDWIFE

## 2020-09-14 PROCEDURE — 84443 ASSAY THYROID STIM HORMONE: CPT | Performed by: ADVANCED PRACTICE MIDWIFE

## 2020-09-14 PROCEDURE — 99396 PREV VISIT EST AGE 40-64: CPT | Mod: 25 | Performed by: ADVANCED PRACTICE MIDWIFE

## 2020-09-14 PROCEDURE — 87624 HPV HI-RISK TYP POOLED RSLT: CPT

## 2020-09-14 PROCEDURE — 36415 COLL VENOUS BLD VENIPUNCTURE: CPT | Performed by: ADVANCED PRACTICE MIDWIFE

## 2020-09-14 RX ORDER — LEVOTHYROXINE SODIUM 137 UG/1
137 TABLET ORAL DAILY
Qty: 90 TABLET | Refills: 3 | Status: SHIPPED | OUTPATIENT
Start: 2020-09-14 | End: 2021-07-23

## 2020-09-14 ASSESSMENT — MIFFLIN-ST. JEOR: SCORE: 1504.3

## 2020-09-14 NOTE — PROGRESS NOTES
Mariah is a 45 year old  female who presents for annual exam.     Menses are regular q 28-30 days and heavier lasting 5 days.  Menses flow: normal and heavy.  Patient's last menstrual period was 2020 (approximate).. Using vasectomy for contraception.  She is not currently considering pregnancy.  Besides routine health maintenance,  she would like to discuss getting blood work done. Needs a refill on Synthroid and would like TSH checked.   GYNECOLOGIC HISTORY:  Menarche: 13  Age at first intercourse: 18 Number of lifetime partners: <6  Mariah is sexually active with male partner(s) and is currently in monogamous relationship with .    History sexually transmitted infections:Herpes  STI testing offered?  Declined  ARABELLA exposure: No  History of abnormal Pap smear: YES - updated in Problem List and Health Maintenance accordingly  Family history of breast CA: No  Family history of uterine/ovarian CA: No    Family history of colon CA: No    HEALTH MAINTENANCE:  Cholesterol:   Cholesterol   Date Value Ref Range Status   2017 185 <200 mg/dL Final   2012 190 0 - 200 mg/dL Final     Comment:     LDL Cholesterol is the primary guide to therapy.   The NCEP recommends further evaluation of: patients with cholesterol greater   than 200 mg/dL if additional risk factors are present, cholesterol greater   than   240 mg/dL, triglycerides greater than 150 mg/dL, or HDL less than 40 mg/dL.    History of abnormal lipids: No  Mammo: 17 . History of abnormal Mammo: No.  Regular Self Breast Exams: Yes  Calcium/Vitamin D intake: source:  dairy Adequate? Yes  TSH:   TSH   Date Value Ref Range Status   2019 0.66 0.40 - 4.00 mU/L Final     Lab Results   Component Value Date    PAP NIL 2017    PAP NIL 2013    PAP NIL 2012       HISTORY:  OB History    Para Term  AB Living   2 2 2 0 0 2   SAB TAB Ectopic Multiple Live Births   0 0 0 0 2      # Outcome Date GA Lbr Curry/2nd  Weight Sex Delivery Anes PTL Lv   2 Term 10/11/13 42w1d 01:45 / 00:28 4.536 kg (10 lb) M Vag-Spont Local N JACEK      Birth Comments: IOL for Postdates--one miso only      Name: Manav Palomo      Apgar1: 7  Apgar5: 9   1 Term 06 41w0d  4.338 kg (9 lb 9 oz) M    JACEK      Name: Albert     Past Medical History:   Diagnosis Date     History of colonoscopy 2019    had 3 polyps removed     Hx of colposcopy with cervical biopsy ,     Hypothyroid      Papanicolaou smear of cervix with low grade squamous intraepithelial lesion (LGSIL) , '10    colp JOSE I, NIL     Serrated adenoma of colon 2019    3 SSAs, largest was 10mm- recommend surveillance colonosopy in 3 years (2022)     Past Surgical History:   Procedure Laterality Date     C NONSPECIFIC PROCEDURE      removal of cyst from foot     COLONOSCOPY N/A 2019    Procedure: COLONOSCOPY, WITH POLYPECTOMY AND BIOPSY;  Surgeon: Romel Shell MD;  [place]     [facility] [place]     radioactive iodine       Family History   Problem Relation Age of Onset     Hypertension Mother      Genitourinary Problems Mother         Kidney stones     Coronary Artery Disease Mother         3 stints placed     Depression Mother      Cerebrovascular Disease Mother      Genitourinary Problems Father      Thyroid Disease Father      Diabetes Maternal Grandmother      Dementia Maternal Grandmother      Hypertension Maternal Grandfather      Cerebrovascular Disease Maternal Grandfather      C.A.D. Maternal Grandfather         MI     C.A.D. Paternal Grandfather         MI     Diabetes Paternal Grandfather      Genitourinary Problems Sister         kidney stones     Social History     Socioeconomic History     Marital status:      Spouse name: Not on file     Number of children: 1     Years of education: Not on file     Highest education level: Not on file   Occupational History     Employer: NONE    Social Needs     Financial  resource strain: Not on file     Food insecurity     Worry: Not on file     Inability: Not on file     Transportation needs     Medical: Not on file     Non-medical: Not on file   Tobacco Use     Smoking status: Never Smoker     Smokeless tobacco: Never Used   Substance and Sexual Activity     Alcohol use: Yes     Alcohol/week: 0.0 standard drinks     Comment: very rarely     Drug use: No     Sexual activity: Yes     Partners: Male     Birth control/protection: Male Surgical   Lifestyle     Physical activity     Days per week: Not on file     Minutes per session: Not on file     Stress: Not on file   Relationships     Social connections     Talks on phone: Not on file     Gets together: Not on file     Attends Hindu service: Not on file     Active member of club or organization: Not on file     Attends meetings of clubs or organizations: Not on file     Relationship status: Not on file     Intimate partner violence     Fear of current or ex partner: Not on file     Emotionally abused: Not on file     Physically abused: Not on file     Forced sexual activity: Not on file   Other Topics Concern     Parent/sibling w/ CABG, MI or angioplasty before 65F 55M? No   Social History Narrative    Caffeine intake/servings daily - 1-2 of tea/day    Calcium intake/servings daily - 2    Exercise 4 times weekly - describe yoga, pilates    Sunscreen used - Yes    Seatbelts used - Yes    Guns stored in the home - No    Self Breast Exam - Yes    Pap test up to date -  Yes    Eye exam up to date -  No    Dental exam up to date -  Yes    DEXA scan up to date -  Not Applicable    Flex Sig/Colonoscopy up to date -  Not Applicable    Mammography up to date -  Not Applicable    Immunizations reviewed and up to date - Yes    Abuse: Current or Past (Physical, Sexual or Emotional) - No    Do you feel safe in your environment - Yes    Do you cope well with stress - Yes    Do you suffer from insomnia - Yes    Last updated by: RIKKI FARIAS  "GARCIA  2/11/2013                               Current Outpatient Medications:      levothyroxine (SYNTHROID/LEVOTHROID) 137 MCG tablet, Take 1 tablet (137 mcg) by mouth daily, Disp: 90 tablet, Rfl: 3     Allergies   Allergen Reactions     Methimazole Itching       Past medical, surgical, social and family history were reviewed and updated in EPIC.    ROS:   C:     NEGATIVE for fever, chills, change in weight  I:       NEGATIVE for worrisome rashes, moles or lesions  E:     NEGATIVE for vision changes or irritation  E/M: NEGATIVE for ear, mouth and throat problems  R:     NEGATIVE for significant cough or SOB  CV:   NEGATIVE for chest pain, palpitations or peripheral edema  GI:     NEGATIVE for nausea, abdominal pain, heartburn, or change in bowel habits  :   NEGATIVE for frequency, dysuria, hematuria, vaginal discharge, or irregular bleeding  M:     NEGATIVE for significant arthralgias or myalgia  N:      NEGATIVE for weakness, dizziness or paresthesias  E:      NEGATIVE for temperature intolerance, skin/hair changes  P:      NEGATIVE for changes in mood or affect.    EXAM:  /80   Pulse 87   Temp 98.4  F (36.9  C) (Oral)   Ht 1.664 m (5' 5.5\")   Wt 85 kg (187 lb 8 oz)   LMP 09/14/2020 (Approximate)   BMI 30.73 kg/m     BMI: Body mass index is 30.73 kg/m .  Constitutional: healthy, alert and no distress  Head: Normocephalic. No masses, lesions, tenderness or abnormalities  Neck: Neck supple. Trachea midline. No adenopathy. Thyroid symmetric, normal size.   Cardiovascular: RRR.   Respiratory: Negative.   Breast: Breasts reveal mild symmetric fibrocystic densities, but there are no dominant, discrete, fixed or suspicious masses found.  Gastrointestinal: Abdomen soft, non-tender, non-distended. No masses, organomegaly.  :  Vulva:  No external lesions, normal female hair distribution, no inguinal adenopathy.    Urethra:  Midline, non-tender, well supported, no discharge  Vagina:  Moist, pink, no " abnormal discharge, no lesions  Cervix: Smooth, pink, no visible lesions  Musculoskeletal: extremities normal  Skin: no suspicious lesions or rashes  Psychiatric: Affect appropriate, cooperative,mentation appears normal.     COUNSELING:        Regular exercise       Healthy diet/nutrition       Vitamin D supplementation   reports that she has never smoked. She has never used smokeless tobacco.    Body mass index is 30.73 kg/m .    ASSESSMENT/PLAN:  45 year old female with satisfactory annual exam  (Z01.419) Well woman exam with routine gynecological exam  (primary encounter diagnosis)  Comment:   Plan:     (Z12.4) Screening for cervical cancer  Comment:   Plan: Pap imaged thin layer screen with HPV -         recommended age 30 - 65 years (select HPV order        below), HPV High Risk Types DNA Cervical            (Z13.9) Screening for condition  Comment:   Plan: Vitamin D Deficiency            (Z12.31) Encounter for screening mammogram for breast cancer  Comment: Pt uncertain when she will feel comfortable coming in for screening mammogram due to COVID but is open to the referral   Plan: MA Screening Digital Bilateral    (Z13.29) Screening for thyroid disorder  Comment: Will notify patient of results via [employer]  Plan: TSH with free T4 reflex    (E03.2) Hypothyroidism due to medication  Comment:   Plan: levothyroxine (SYNTHROID/LEVOTHROID) 137 MCG         tablet          (Z23) Need for prophylactic vaccination and inoculation against influenza  Comment:   Plan: INFLUENZA VACCINE IM > 6 MONTHS VALENT IIV4         [33386], Vaccine Administration, Initial         [35948]          RTC yearly for annual exam  If pap NIL and HPV neg will plan to go to q5y paps  Pattie Elliott CNM

## 2020-09-14 NOTE — NURSING NOTE
"Chief Complaint   Patient presents with     Physical     annual and check thyroid levels       Initial /80   Pulse 87   Temp 98.4  F (36.9  C) (Oral)   Ht 1.664 m (5' 5.5\")   Wt 85 kg (187 lb 8 oz)   LMP 2020 (Approximate)   BMI 30.73 kg/m   Estimated body mass index is 30.73 kg/m  as calculated from the following:    Height as of this encounter: 1.664 m (5' 5.5\").    Weight as of this encounter: 85 kg (187 lb 8 oz).  BP completed using cuff size: large    Questioned patient about current smoking habits.  Pt. has never smoked.          The following HM Due: mammogram      The following patient reported/Care Every where data was sent to:  P ABSTRACT QUALITY INITIATIVES [00081]        patient has appointment for today  Jordana Bedoya                "

## 2020-09-21 LAB
DEPRECATED CALCIDIOL+CALCIFEROL SERPL-MC: 24 UG/L (ref 20–75)
TSH SERPL DL<=0.005 MIU/L-ACNC: 1.52 MU/L (ref 0.4–4)

## 2020-09-23 LAB
FINAL DIAGNOSIS: NORMAL
HPV HR 12 DNA CVX QL NAA+PROBE: NEGATIVE
HPV16 DNA SPEC QL NAA+PROBE: NEGATIVE
HPV18 DNA SPEC QL NAA+PROBE: NEGATIVE
SPECIMEN DESCRIPTION: NORMAL
SPECIMEN SOURCE CVX/VAG CYTO: NORMAL

## 2021-02-14 ENCOUNTER — HEALTH MAINTENANCE LETTER (OUTPATIENT)
Age: 46
End: 2021-02-14

## 2021-03-08 ENCOUNTER — TELEPHONE (OUTPATIENT)
Dept: MIDWIFE SERVICES | Facility: CLINIC | Age: 46
End: 2021-03-08

## 2021-03-08 NOTE — TELEPHONE ENCOUNTER
TC to patient. Discussed message below. Pt will start the Ibuprofen regimen. Scheduled to see HJ tomorrow at 10:15am.  Ayaka Garcia RN-BSN

## 2021-03-08 NOTE — TELEPHONE ENCOUNTER
It does not sound emergent. If she starts soaking pads, feeling lightheaded or short of breath, she should be seen soon or in the emergency department. I would recommend she schedule a clinic visit this week to help figure out the source of her bleeding. It could be perimenopausal symptoms, cervical polyp, STD, or a number of other issues. Sometimes we don't know the cause. She could also try the ibuprofen regimen (600mg every 6 hours for 6 days) to see if that helps her bleeding. Use caution if history of GI issues or ulcers.  Vijay Marley CNM

## 2021-03-08 NOTE — TELEPHONE ENCOUNTER
Patient calling with c/o menses. She is currently on day 11 of bleeding. Had spotting for 5 days, then stopped for a week and then started period. First few days were normal period like bleeding, heavier. On day 11 of bleeding. No current pain or cramping. Nothing abnormal.  Right now bleeding is bright red. Blood coming out doesn't feel like menstrual bleeding. Feels more like bleeding.  Pt wondering if this is cause for concern? Should she just monitor? Concerned that it feels different and on day 11 of bleeding. Routing to on-call CNM.   Ayaka Garcia, RN-BSN

## 2021-03-09 ENCOUNTER — OFFICE VISIT (OUTPATIENT)
Dept: MIDWIFE SERVICES | Facility: CLINIC | Age: 46
End: 2021-03-09
Payer: COMMERCIAL

## 2021-03-09 VITALS
OXYGEN SATURATION: 96 % | BODY MASS INDEX: 31.14 KG/M2 | DIASTOLIC BLOOD PRESSURE: 75 MMHG | HEART RATE: 75 BPM | WEIGHT: 190 LBS | SYSTOLIC BLOOD PRESSURE: 119 MMHG

## 2021-03-09 DIAGNOSIS — N93.8 DUB (DYSFUNCTIONAL UTERINE BLEEDING): Primary | ICD-10-CM

## 2021-03-09 DIAGNOSIS — K31.7 BENIGN GASTRIC POLYP: ICD-10-CM

## 2021-03-09 DIAGNOSIS — Z01.419 WELL WOMAN EXAM: ICD-10-CM

## 2021-03-09 LAB
CHOLEST SERPL-MCNC: 246 MG/DL
FSH SERPL-ACNC: 4.4 IU/L
HBA1C MFR BLD: 5.3 % (ref 0–5.6)
HDLC SERPL-MCNC: 78 MG/DL
LDLC SERPL CALC-MCNC: 139 MG/DL
NONHDLC SERPL-MCNC: 168 MG/DL
PROLACTIN SERPL-MCNC: 4 UG/L (ref 3–27)
TRIGL SERPL-MCNC: 145 MG/DL
TSH SERPL DL<=0.005 MIU/L-ACNC: 0.77 MU/L (ref 0.4–4)

## 2021-03-09 PROCEDURE — 80061 LIPID PANEL: CPT | Performed by: ADVANCED PRACTICE MIDWIFE

## 2021-03-09 PROCEDURE — 83036 HEMOGLOBIN GLYCOSYLATED A1C: CPT | Performed by: ADVANCED PRACTICE MIDWIFE

## 2021-03-09 PROCEDURE — 99214 OFFICE O/P EST MOD 30 MIN: CPT | Performed by: ADVANCED PRACTICE MIDWIFE

## 2021-03-09 PROCEDURE — 84443 ASSAY THYROID STIM HORMONE: CPT | Performed by: ADVANCED PRACTICE MIDWIFE

## 2021-03-09 PROCEDURE — 84146 ASSAY OF PROLACTIN: CPT | Performed by: ADVANCED PRACTICE MIDWIFE

## 2021-03-09 PROCEDURE — 36415 COLL VENOUS BLD VENIPUNCTURE: CPT | Performed by: ADVANCED PRACTICE MIDWIFE

## 2021-03-09 PROCEDURE — 83001 ASSAY OF GONADOTROPIN (FSH): CPT | Performed by: ADVANCED PRACTICE MIDWIFE

## 2021-03-09 NOTE — PROGRESS NOTES
S: Mariah Perera presents to the clinic with concerns of irregular and prolonged period. These period changes she noticed for the past 12 months. Usually she has 4-5 days of spotting which resolves and then she has a full period lasting 5-6 days afterward. This month however, the spotting was more significant and was heavy from 2/15-2/20. Then on 2/26 she got her menses and she is still bleeding, however it is gradually tapering. She denies any other symptoms of perimenopause. She is not taking any hormones and is using vasectomy as contraception.     She has postablative hypothyroid and is stable on 137mcg of levothyroxine, with her last lab check in September.    Past Medical History:   Diagnosis Date     History of colonoscopy 11/01/2019    had 3 polyps removed     Hx of colposcopy with cervical biopsy 2009,2010     Hypothyroid      Papanicolaou smear of cervix with low grade squamous intraepithelial lesion (LGSIL) '09, '10    colp JOSE I, NIL     Serrated adenoma of colon 11/21/2019    3 SSAs, largest was 10mm- recommend surveillance colonosopy in 3 years (11/2022)         O: /75   Pulse 75   Wt 86.2 kg (190 lb)   LMP 02/26/2021   SpO2 96%   Breastfeeding No   BMI 31.14 kg/m    Heart: RRR  Lungs: clear to auscultation    A: Dysfunction uterine bleeding, with unknown etiology,    We discussed possible etiologies of DUB including perimenopause, uterine pathology, thyroid imbalance. Recommend labs and pelvic ultrasound    (N93.8) DUB (dysfunctional uterine bleeding)  (primary encounter diagnosis)  Comment:   Plan: US Pelvic Complete with Transvaginal, TSH with         free T4 reflex, Hemoglobin A1c, Prolactin,         Follicle stimulating hormone        :     (Z01.419) Well woman exam  Comment:   Plan: Lipid Profile    Plan:  Discussed and agrees with plan. Will wait for results to guide treatment. If normal results will consider ocps.     Aiyana Doherty, HAJA, APRN NIELSM

## 2021-03-10 ENCOUNTER — ANCILLARY PROCEDURE (OUTPATIENT)
Dept: ULTRASOUND IMAGING | Facility: CLINIC | Age: 46
End: 2021-03-10
Attending: ADVANCED PRACTICE MIDWIFE
Payer: COMMERCIAL

## 2021-03-10 DIAGNOSIS — N93.8 DUB (DYSFUNCTIONAL UTERINE BLEEDING): ICD-10-CM

## 2021-03-10 PROCEDURE — 76856 US EXAM PELVIC COMPLETE: CPT | Performed by: OBSTETRICS & GYNECOLOGY

## 2021-03-10 PROCEDURE — 76830 TRANSVAGINAL US NON-OB: CPT | Performed by: OBSTETRICS & GYNECOLOGY

## 2021-03-10 NOTE — RESULT ENCOUNTER NOTE
Dear Mariah,    Your test results are attached below. Your labs show that your thyroid is in good control, you are not diabetic and you are not menopausal. Your cholesterol is higher than goal. There recommendation for this would be to increase exercise and adjust your diet to decrease bad fats and increase good healthy eating and then check again in one year. Unfortunately it does not tell us why your period is funky. We'll have to wait for the ultrasound results.  If you have any questions, please contact me via ezzai - how to arabia or you can call our office at [phone].    Aiyana Bond, HAJA, APRN NIELSM, CNM

## 2021-03-11 ENCOUNTER — TELEPHONE (OUTPATIENT)
Dept: MIDWIFE SERVICES | Facility: CLINIC | Age: 46
End: 2021-03-11

## 2021-03-11 DIAGNOSIS — N93.8 DUB (DYSFUNCTIONAL UTERINE BLEEDING): Primary | ICD-10-CM

## 2021-03-11 DIAGNOSIS — Z00.00 WELL WOMAN EXAM WITHOUT GYNECOLOGICAL EXAM: ICD-10-CM

## 2021-03-11 RX ORDER — LEVONORGESTREL/ETHIN.ESTRADIOL 0.1-0.02MG
1 TABLET ORAL DAILY
Qty: 84 TABLET | Refills: 3 | Status: SHIPPED | OUTPATIENT
Start: 2021-03-11 | End: 2022-01-10

## 2021-03-11 NOTE — TELEPHONE ENCOUNTER
Called patient to discus results. Rx given for OCPs. Discussed starting and how to take them. Has taken in the past. Also discussed option of Larcs. Recommend repeat ultrasound in 6 months to f/u on ovarian cysts and endometrium. Patient would like to make lifestyle and diet changes and repeat her cholesterol at that time. Ordered for future.    Aiyana Doherty, NIELSM, APRN CNM

## 2021-03-11 NOTE — TELEPHONE ENCOUNTER
Patient saw your ArtsApp message you sent to her in regards to her US results from 3/10/2021. She does have further questions for you about the cysts that were seen. She wants to speak with you only, so is ok with waiting to hear back on Monday if needed when you are on call.     She is okay with starting an OCP. I have queued the pharmacy for you. Ayaka Sheridan RN

## 2021-03-11 NOTE — RESULT ENCOUNTER NOTE
Dear Mariah,    Your ultrasound results are unremarkable. I think that you are a good candidate to start some low dose birth control pills to manage your periods. Hopefully they will help to eliminate the days of spotting in your current menstrual cycle so you only have a more normal 4-5 day period. They will likely also make your period lighter during those days. Does this sound like a good plan to you? If so, what pharmacy do you use and do you have any questions about taking the pill?  If you have any questions, please contact me via BiBCOM or you can call our office at [phone].    Aiyana Bond, HAJA, APRN HAJA, CNM

## 2021-04-13 ENCOUNTER — TELEPHONE (OUTPATIENT)
Dept: MIDWIFE SERVICES | Facility: CLINIC | Age: 46
End: 2021-04-13

## 2021-04-13 NOTE — TELEPHONE ENCOUNTER
Called patient to recommend endometrial biopsy at this point. She took 17 days of OCPS with quick return of DUB.  Stop OCPs and take Ibuprofen until biopsy. Understands and agrees with plan.     Aiyana Doherty, NIELSM, APRN CNM

## 2021-04-13 NOTE — TELEPHONE ENCOUNTER
"Patient calling with c/o period bleeding. Was seen on 3/11/21 for irregular and prolonged periods. She was prescribed levonorgestrel-ethinyl estradiol (AVIANE) 0.1-20 MG-MCG tablet. Pt started the pack. On day 17 started spotting for 2 days and has had heavier bleeding the past 7 days. The bleeding is not getting lighter, it's getting heavier. No pain or cramping. Has been passing more clots. This morning there was one with some \"3D nature\" to it. Continued to take the pills in the pack - did not stop them.   Usually with her period she would have spotting, a break in bleeding, and then period. Pt wondering if this is normal or if she needs to be switched on birth control. Discussed with patient that it can take up to 3 months for body to regulate on new birth control. Pt concerned that the bleeding is getting heavier and not lightening up. Routing to on-call CNM. Please advise - should she continue on current OCP or do you have any other recommendation?   "

## 2021-04-15 ENCOUNTER — OFFICE VISIT (OUTPATIENT)
Dept: OBGYN | Facility: CLINIC | Age: 46
End: 2021-04-15
Payer: COMMERCIAL

## 2021-04-15 VITALS
WEIGHT: 189 LBS | DIASTOLIC BLOOD PRESSURE: 76 MMHG | BODY MASS INDEX: 30.97 KG/M2 | HEART RATE: 94 BPM | SYSTOLIC BLOOD PRESSURE: 143 MMHG | OXYGEN SATURATION: 98 %

## 2021-04-15 DIAGNOSIS — N92.0 MENORRHAGIA WITH REGULAR CYCLE: Primary | ICD-10-CM

## 2021-04-15 LAB — HCG UR QL: NEGATIVE

## 2021-04-15 PROCEDURE — 99213 OFFICE O/P EST LOW 20 MIN: CPT | Mod: 25 | Performed by: OBSTETRICS & GYNECOLOGY

## 2021-04-15 PROCEDURE — 88305 TISSUE EXAM BY PATHOLOGIST: CPT | Performed by: PATHOLOGY

## 2021-04-15 PROCEDURE — 81025 URINE PREGNANCY TEST: CPT | Performed by: OBSTETRICS & GYNECOLOGY

## 2021-04-15 PROCEDURE — 58100 BIOPSY OF UTERUS LINING: CPT | Performed by: OBSTETRICS & GYNECOLOGY

## 2021-04-15 NOTE — PROGRESS NOTES
I was asked to see Mariah Perera by Aiyana Doherty  for consultation regarding management of DUB    HPI:  Mariah Perera is a 46 year old female  here for a consultation regarding:  Abnormal uterine bleeding evaluation.   She has had regular monthly cycles until recently she had 2 periods that lasted 12 days.   Lab eval showed normal TSH, prl, FSH  pelvic ultrasound done 3/10/21 showed EMS at 15 mm with ovulatory cysts on both ovaries and no fibroids.   She started oral contraceptive pills on 3/20 began spotting on  and had a full period on  and bleeding is finally now tapering off.    has a vasectomy.    x 2       Patient's last menstrual period was 2021.            Past GYN history:   Lab Results   Component Value Date    PAP NIL 2017    PAP NIL 2013    PAP NIL 2012           Past Medical History:   Diagnosis Date     History of colonoscopy 2019    had 3 polyps removed     Hx of colposcopy with cervical biopsy ,     Hypothyroid      Papanicolaou smear of cervix with low grade squamous intraepithelial lesion (LGSIL) ', '10    colp JOSE I, NIL     Serrated adenoma of colon 2019    3 SSAs, largest was 10mm- recommend surveillance colonosopy in 3 years (2022)       Past Surgical History:   Procedure Laterality Date     COLONOSCOPY N/A 2019    Procedure: COLONOSCOPY, WITH POLYPECTOMY AND BIOPSY;  Surgeon: Romel Shell MD;  [place]     [facility] [place]    normal     radioactive iodine       Z NONSPECIFIC PROCEDURE      removal of cyst from foot       Family History   Problem Relation Age of Onset     Hypertension Mother      Genitourinary Problems Mother         Kidney stones     Coronary Artery Disease Mother         3 stints placed     Depression Mother      Cerebrovascular Disease Mother      Genitourinary Problems Father      Thyroid Disease Father      Diabetes Maternal Grandmother       Dementia Maternal Grandmother      Hypertension Maternal Grandfather      Cerebrovascular Disease Maternal Grandfather      C.A.D. Maternal Grandfather         MI     C.A.D. Paternal Grandfather         MI     Diabetes Paternal Grandfather      Genitourinary Problems Sister         kidney stones         Medications:    Current Outpatient Medications:      levonorgestrel-ethinyl estradiol (AVIANE) 0.1-20 MG-MCG tablet, Take 1 tablet by mouth daily, Disp: 84 tablet, Rfl: 3     levothyroxine (SYNTHROID/LEVOTHROID) 137 MCG tablet, Take 1 tablet (137 mcg) by mouth daily, Disp: 90 tablet, Rfl: 3    Allergies:  Methimazole         EXAM:  BP (!) 143/76   Pulse 94   Wt 85.7 kg (189 lb)   LMP 04/08/2021   SpO2 98%   Breastfeeding No   BMI 30.97 kg/m      General - pleasant female in no acute distress.  Neurological - Alert and oriented  Psych:  normal mood and affect.  normal respiratory and cardiovascular effort   Breast - deferred.  Abdomen - soft, nontender, non distended, no masses.  Musculoskeletal - no gross deformities.  Skin- no rashes seen        Pelvic:  EG - normal adult female.  BUS - within normal limits.  Vagina - well rugated, no discharge.  Cervix - no lesions, no CMT.  Uterus - firm, normal size and nontender.  Adnexae - no masses or tenderness.  RV - deferred.    PROCEDURE:    After obtaining consent from the patient, an endometrial biopsy was performed.  A bimanual exam was first done.  The cervix was prepped with betadine and a tenaculum placed on the cervix.  The cervical os was dilated with a small dilator and the uterus sounded to 8 cm.  The pipelle was inserted without difficulty.  A moderate amount of tissue was obtained and sent to pathology.  She tolerated the procedure well.  No complications.       ASSESSMENT:  Encounter Diagnosis   Name Primary?     Menorrhagia with regular cycle Yes        PLAN:   Discussion with the patient regarding the differential diagnosis of dysfunctional uterine  bleeding, including  fibroids, polyps, hyperplasia, perimenopause or endocrine disorders including hormonal imbalances.  Recommended endometrial biopsy to rule out hyperplasia.  Patient agreed to do today.   discussed that if bx Is normal, ok to continue with oral contraceptive pills for 3 months to see if that will reset her cycles.   All questions were answered.     Orders Placed This Encounter   Procedures     ENDOMETRIAL BIOPSY W/O CERVICAL DILATION     HCG Qual, Urine ([id number])     Surgical pathology exam       Sylvia Duckworth MD

## 2021-04-20 LAB — COPATH REPORT: NORMAL

## 2021-07-15 ENCOUNTER — TELEPHONE (OUTPATIENT)
Dept: MIDWIFE SERVICES | Facility: CLINIC | Age: 46
End: 2021-07-15

## 2021-07-16 NOTE — TELEPHONE ENCOUNTER
Patient advised. She would like to continue on the OCP and will call back if any changes or concerns arise. Ayaka Sheridan RN

## 2021-07-16 NOTE — TELEPHONE ENCOUNTER
Patient is wondering if you still want her to have a pelvic US. Patient was seen on 6/3 to follow-up on menorrhagia. She started a a new dose pill. Patient reports feeing better since starting the ortho-cyclen. It looks like the US was ordered to also follow-up on simple ovarian cyst. Do you still recommend the US? Ayaka Sheridan, RN

## 2021-07-16 NOTE — TELEPHONE ENCOUNTER
The ovarian cyst were simple and do not need follow up. Often we offer follow up for patient reassurance. Since she is better on medium dose OCP and does not feel like she needs the ultrasound for reassurance I think we can skip it. However, if she does have new symptoms of pelvic pain or pressure we can reorder at anytime.     ,Aiyana Doherty, NIELSM, APRN CNM

## 2021-07-23 DIAGNOSIS — E03.2 HYPOTHYROIDISM DUE TO MEDICATION: ICD-10-CM

## 2021-07-23 RX ORDER — LEVOTHYROXINE SODIUM 137 UG/1
137 TABLET ORAL DAILY
Qty: 90 TABLET | Refills: 2 | Status: SHIPPED | OUTPATIENT
Start: 2021-07-23 | End: 2022-03-29

## 2021-07-23 NOTE — TELEPHONE ENCOUNTER
Pharmacy sent refill request for levothyroxine.  Pt's last TSH checked 3/2021 was stable.  Ok for refill that will last until next annual exam in 3/2022?  Liss Issa RN

## 2021-09-09 ENCOUNTER — OFFICE VISIT (OUTPATIENT)
Dept: MIDWIFE SERVICES | Facility: CLINIC | Age: 46
End: 2021-09-09
Payer: COMMERCIAL

## 2021-09-09 VITALS
DIASTOLIC BLOOD PRESSURE: 86 MMHG | HEART RATE: 85 BPM | SYSTOLIC BLOOD PRESSURE: 126 MMHG | BODY MASS INDEX: 28.37 KG/M2 | HEIGHT: 66 IN | WEIGHT: 176.5 LBS | OXYGEN SATURATION: 100 %

## 2021-09-09 DIAGNOSIS — N89.8 VAGINAL CYSTS: Primary | ICD-10-CM

## 2021-09-09 PROCEDURE — 99212 OFFICE O/P EST SF 10 MIN: CPT | Performed by: ADVANCED PRACTICE MIDWIFE

## 2021-09-09 ASSESSMENT — MIFFLIN-ST. JEOR: SCORE: 1449.41

## 2021-09-09 ASSESSMENT — PATIENT HEALTH QUESTIONNAIRE - PHQ9: SUM OF ALL RESPONSES TO PHQ QUESTIONS 1-9: 3

## 2021-09-09 NOTE — PROGRESS NOTES
"S:  Mariah Perera is a 46 year old  who presents today for new vaginal bumps. She noticed them on . She thought it was a tick but it was a dark colored bump. She had her  look at it and he noted a few more on both sides of her labia. Once she found them she noted mild irritation but they otherwise do not bother her. They have reduced in size for the past 5 days. She has no other associated symptoms, no vaginal discharge, irritation, or rash. She was started on a new birth control to control her heavy bleeding, this was started in July so still a new medication for her. So far it is working well to control the bleeding. She has a history of oral herpes as well as her partner. No history of genital herpes for either.     O:  /86   Pulse 85   Ht 1.664 m (5' 5.5\")   Wt 80.1 kg (176 lb 8 oz)   LMP 2021   SpO2 100%   BMI 28.92 kg/m     Patient is well  Pelvic exam: normal vagina and vulva, vaginal discharge described as white. 4 small cysts, appear to be like blood blister, darker red in color. 3 are very small, pin point size, 4th is a little larger than that. No rash or lesions. No other cysts.     A:  Vaginal bumps, benign     P:  (N89.8) Vaginal cysts  (primary encounter diagnosis)  Comment: Discussed vaginal cysts, warm water, baths, continue to monitor, return if worsening or changes.     Cecily Gonzalez, DICKSON SMYTH         "

## 2021-09-25 ENCOUNTER — HEALTH MAINTENANCE LETTER (OUTPATIENT)
Age: 46
End: 2021-09-25

## 2021-11-20 ENCOUNTER — HEALTH MAINTENANCE LETTER (OUTPATIENT)
Age: 46
End: 2021-11-20

## 2021-12-09 ENCOUNTER — TELEPHONE (OUTPATIENT)
Dept: GASTROENTEROLOGY | Facility: CLINIC | Age: 46
End: 2021-12-09

## 2021-12-09 ENCOUNTER — TELEPHONE (OUTPATIENT)
Dept: MIDWIFE SERVICES | Facility: CLINIC | Age: 46
End: 2021-12-09

## 2021-12-09 DIAGNOSIS — K31.7 BENIGN GASTRIC POLYP: Primary | ICD-10-CM

## 2021-12-09 DIAGNOSIS — Z12.11 COLON CANCER SCREENING: ICD-10-CM

## 2021-12-09 NOTE — TELEPHONE ENCOUNTER
"Patient calling today to ask for a referral for a colonoscopy. States she called [facility] but that they weren't able schedule her until March or April and they told her that if she had a referral they might be able to get her in sooner. Referral was last placed in 10/2019 because younger sister was dx with colon polyps. Colonoscopy done on 11/21/2019, recommendation was to repeat in 3 years. Patient states she was told that if symptoms began she should repeat before the 3 year terry. Reports some \"concerning\" change in her bowel movements over the past 7 weeks - denied pain, bleeding, significantly bloating or swelling. Asks if you could place the referral for her. States she only wants the referral so she could get an earlier appointment with colorectal. Appropriate to place referral?   Rosalinda Brewster RN   "

## 2021-12-09 NOTE — TELEPHONE ENCOUNTER
[facility] Call Center    Phone Message    May a detailed message be left on voicemail: yes     Reason for Call: Symptoms or Concerns       Current symptom or concern: diarrhea    Symptoms have been present for:  2 week(s)  Had a colonoscopy  in 2019. Would like to discuss if she she schedule another due to symptoms.    Are there any new or worsening symptoms? Yes:      Action Taken: Message routed to:  [facility] gastro    Travel Screening: Not Applicable

## 2021-12-10 ENCOUNTER — TELEPHONE (OUTPATIENT)
Dept: GASTROENTEROLOGY | Facility: CLINIC | Age: 46
End: 2021-12-10

## 2021-12-10 NOTE — TELEPHONE ENCOUNTER
Screening Questions  1. Are you active on mychart? Y    2. What insurance is in the chart? [employer]    2.  Ordering/Referring Provider: Suleman    3. BMI 28.5 , If greater than 40 review exclusion criteria    4.  Respiratory Screening (If yes to any of the following Hospital setting only):     Do you use daily home oxygen? N  Do you have mod to severe Obstructive Sleep Apnea? N   Do you have Pulmonary Hypertension? N   Do you have UNCONTROLLED asthma? N    5. Have you had a heart or lung transplant (If yes, please review exclusion criteria) ? N    6. Are you currently on dialysis or have chronic kidney disease? N    7. Have you had a stroke or Transient ischemic attack (TIA) within 6 months? N    8. In the past 6 months, have you had any heart related issues including cardiomyopathy or heart attack? N                 If yes, did it require cardiac stenting or other implantable device?N      9. Do you have any implantable devices in your body (pacemaker, defib, LVAD)? N    10. Do you take nitroglycerin? If yes, how often? N    11. Are you currently taking any blood thinners?N    12. Are you a diabetic? N    13. (Females) Are you currently pregnant? N  If yes, how many weeks? N      15. Are you taking any prescription pain medications on a routine schedule? N If yes, MAC sedation.    16. Do you have any chemical dependencies such as alcohol, street drugs, or methadone? N If yes, MAC sedation.    17. Do you have any history of post-traumatic stress syndrome, severe anxiety or history of psychosis? N    18. Do you transfer independently? Y    19.  Do you have any issues with constipation? N    20. Preferred Pharmacy for Pre Prescription [id number] [place]    Scheduling Details    Which Colonoscopy Prep was Sent?: Golytely PT used 2 years ago and it worked well  Procedure Scheduled: Colon  Surgeon: Suleman  Date of Procedure: 1-10   Location: [facility] [place]  Caller (Please ask for phone number if not scheduled by  patient): Mariah      Sedation Type: CS  Conscious Sedation- Needs  for 6 hours after the procedure  MAC/General-Needs  for 24 hours after procedure    Pre-op Required at [facility] [place], [place] and OR for MAC sedation:   (if yes advise patient they will need a pre-op prior to procedure) N     Is patient on blood thinners? -N (If yes- inform patient to follow up with PCP or provider for follow up instructions)     Informed patient they will need an adult  Y  Cannot take any type of public or medical transportation alone    Pre-Procedure Covid test to be completed at [facility] or Externally: Y  lab 1-6    Confirmed Nurse will call to complete assessment Y    Additional comments:

## 2021-12-10 NOTE — TELEPHONE ENCOUNTER
Left message for pt to call back with any questions. Put in order for pt to have colonoscopy based on Dr. Shell colonoscopy note his recommendation was for pt to have repeat in 3 years or sooner with symptoms.     Sent Invenshure message with information as well.

## 2021-12-14 DIAGNOSIS — Z11.59 ENCOUNTER FOR SCREENING FOR OTHER VIRAL DISEASES: ICD-10-CM

## 2022-01-04 ENCOUNTER — TELEPHONE (OUTPATIENT)
Dept: GASTROENTEROLOGY | Facility: CLINIC | Age: 47
End: 2022-01-04

## 2022-01-04 DIAGNOSIS — Z12.11 ENCOUNTER FOR SCREENING COLONOSCOPY: Primary | ICD-10-CM

## 2022-01-04 RX ORDER — BISACODYL 5 MG/1
TABLET, DELAYED RELEASE ORAL
Qty: 4 TABLET | Refills: 0 | Status: SHIPPED | OUTPATIENT
Start: 2022-01-04 | End: 2022-04-18

## 2022-01-04 NOTE — TELEPHONE ENCOUNTER
Pre assessment questions completed for upcoming colonoscopy procedure scheduled on 1/10/22    COVID test scheduled 1/6/22    Reviewed procedural arrival time 1140 and facility location [facility].    Designated  policy reviewed. Instructed to have someone stay 6 hours post procedure.     Bowel prep recommendation: Golytely prep.     Golytely prep script sent to [id number] - [place], [place] - [address] [place] [address] pharmacy. Prep instructions sent via [employer].    Reviewed Golytely prep instructions with patient. No fiber/iron supplements or foods that contain nuts/seeds 7 days prior to procedure.     Anticoagulation/blood thinners? no    Electronic implanted devices? no    Patient verbalized understanding and had no questions or concerns at this time.    Becky Guillermo RN

## 2022-01-06 ENCOUNTER — LAB (OUTPATIENT)
Dept: LAB | Facility: CLINIC | Age: 47
End: 2022-01-06
Payer: COMMERCIAL

## 2022-01-06 DIAGNOSIS — Z11.59 ENCOUNTER FOR SCREENING FOR OTHER VIRAL DISEASES: ICD-10-CM

## 2022-01-06 PROCEDURE — U0005 INFEC AGEN DETEC AMPLI PROBE: HCPCS

## 2022-01-06 PROCEDURE — U0003 INFECTIOUS AGENT DETECTION BY NUCLEIC ACID (DNA OR RNA); SEVERE ACUTE RESPIRATORY SYNDROME CORONAVIRUS 2 (SARS-COV-2) (CORONAVIRUS DISEASE [COVID-19]), AMPLIFIED PROBE TECHNIQUE, MAKING USE OF HIGH THROUGHPUT TECHNOLOGIES AS DESCRIBED BY CMS-2020-01-R: HCPCS

## 2022-01-07 LAB — SARS-COV-2 RNA RESP QL NAA+PROBE: NEGATIVE

## 2022-01-10 ENCOUNTER — HOSPITAL ENCOUNTER (OUTPATIENT)
Facility: AMBULATORY SURGERY CENTER | Age: 47
Discharge: HOME | End: 2022-01-10
Attending: INTERNAL MEDICINE | Admitting: INTERNAL MEDICINE
Payer: COMMERCIAL

## 2022-01-10 VITALS
TEMPERATURE: 98.6 F | WEIGHT: 175 LBS | DIASTOLIC BLOOD PRESSURE: 66 MMHG | BODY MASS INDEX: 29.16 KG/M2 | HEIGHT: 65 IN | HEART RATE: 76 BPM | RESPIRATION RATE: 16 BRPM | OXYGEN SATURATION: 99 % | SYSTOLIC BLOOD PRESSURE: 118 MMHG

## 2022-01-10 LAB
COLONOSCOPY: NORMAL
HCG UR QL: NEGATIVE
INTERNAL QC OK POCT: NORMAL
POCT KIT EXPIRATION DATE: NORMAL
POCT KIT LOT NUMBER: NORMAL

## 2022-01-10 PROCEDURE — 45380 COLONOSCOPY AND BIOPSY: CPT | Mod: 33

## 2022-01-10 PROCEDURE — 88305 TISSUE EXAM BY PATHOLOGIST: CPT | Mod: TC | Performed by: INTERNAL MEDICINE

## 2022-01-10 PROCEDURE — 81025 URINE PREGNANCY TEST: CPT | Performed by: PATHOLOGY

## 2022-01-10 RX ORDER — NALOXONE HYDROCHLORIDE 0.4 MG/ML
0.2 INJECTION, SOLUTION INTRAMUSCULAR; INTRAVENOUS; SUBCUTANEOUS
Status: CANCELLED | OUTPATIENT
Start: 2022-01-10

## 2022-01-10 RX ORDER — ONDANSETRON 2 MG/ML
4 INJECTION INTRAMUSCULAR; INTRAVENOUS
Status: DISCONTINUED | OUTPATIENT
Start: 2022-01-10 | End: 2022-01-11 | Stop reason: HOSPADM

## 2022-01-10 RX ORDER — FENTANYL CITRATE 50 UG/ML
INJECTION, SOLUTION INTRAMUSCULAR; INTRAVENOUS PRN
Status: DISCONTINUED | OUTPATIENT
Start: 2022-01-10 | End: 2022-01-10 | Stop reason: HOSPADM

## 2022-01-10 RX ORDER — ONDANSETRON 4 MG/1
4 TABLET, ORALLY DISINTEGRATING ORAL EVERY 6 HOURS PRN
Status: CANCELLED | OUTPATIENT
Start: 2022-01-10

## 2022-01-10 RX ORDER — NALOXONE HYDROCHLORIDE 0.4 MG/ML
0.4 INJECTION, SOLUTION INTRAMUSCULAR; INTRAVENOUS; SUBCUTANEOUS
Status: CANCELLED | OUTPATIENT
Start: 2022-01-10

## 2022-01-10 RX ORDER — ONDANSETRON 2 MG/ML
4 INJECTION INTRAMUSCULAR; INTRAVENOUS EVERY 6 HOURS PRN
Status: CANCELLED | OUTPATIENT
Start: 2022-01-10

## 2022-01-10 RX ORDER — SIMETHICONE
LIQUID (ML) MISCELLANEOUS PRN
Status: DISCONTINUED | OUTPATIENT
Start: 2022-01-10 | End: 2022-01-10 | Stop reason: HOSPADM

## 2022-01-10 RX ORDER — LIDOCAINE 40 MG/G
CREAM TOPICAL
Status: DISCONTINUED | OUTPATIENT
Start: 2022-01-10 | End: 2022-01-11 | Stop reason: HOSPADM

## 2022-01-10 RX ORDER — FLUMAZENIL 0.1 MG/ML
0.2 INJECTION, SOLUTION INTRAVENOUS
Status: CANCELLED | OUTPATIENT
Start: 2022-01-10 | End: 2022-01-11

## 2022-01-10 RX ORDER — PROCHLORPERAZINE MALEATE 10 MG
10 TABLET ORAL EVERY 6 HOURS PRN
Status: CANCELLED | OUTPATIENT
Start: 2022-01-10

## 2022-01-10 ASSESSMENT — MIFFLIN-ST. JEOR: SCORE: 1434.67

## 2022-01-13 DIAGNOSIS — K52.832 LYMPHOCYTIC COLITIS: Primary | ICD-10-CM

## 2022-01-13 LAB
PATH REPORT.COMMENTS IMP SPEC: NORMAL
PATH REPORT.COMMENTS IMP SPEC: NORMAL
PATH REPORT.FINAL DX SPEC: NORMAL
PATH REPORT.GROSS SPEC: NORMAL
PATH REPORT.MICROSCOPIC SPEC OTHER STN: NORMAL
PATH REPORT.RELEVANT HX SPEC: NORMAL
PHOTO IMAGE: NORMAL

## 2022-01-13 PROCEDURE — 88305 TISSUE EXAM BY PATHOLOGIST: CPT | Mod: 26 | Performed by: PATHOLOGY

## 2022-01-13 RX ORDER — BUDESONIDE 3 MG/1
CAPSULE, COATED PELLETS ORAL
Qty: 63 CAPSULE | Refills: 0 | Status: SHIPPED | OUTPATIENT
Start: 2022-01-13 | End: 2022-01-14

## 2022-01-14 ENCOUNTER — PATIENT OUTREACH (OUTPATIENT)
Dept: GASTROENTEROLOGY | Facility: CLINIC | Age: 47
End: 2022-01-14
Payer: COMMERCIAL

## 2022-01-14 DIAGNOSIS — K52.832 LYMPHOCYTIC COLITIS: ICD-10-CM

## 2022-01-14 RX ORDER — BUDESONIDE 3 MG/1
CAPSULE, COATED PELLETS ORAL
Qty: 63 CAPSULE | Refills: 0 | Status: SHIPPED | OUTPATIENT
Start: 2022-01-14 | End: 2022-04-18

## 2022-01-14 NOTE — TELEPHONE ENCOUNTER
Pt calling in to have budesonide Rx sent to a different pharmacy, travon on [facility] in [facility]. Writer resent it.

## 2022-03-06 NOTE — NURSING NOTE
"Chief Complaint   Patient presents with     Physical       Initial /70   Pulse 90   Wt 80.7 kg (178 lb)   LMP 2019   BMI 28.95 kg/m   Estimated body mass index is 28.95 kg/m  as calculated from the following:    Height as of 3/14/18: 1.67 m (5' 5.75\").    Weight as of this encounter: 80.7 kg (178 lb).  BP completed using cuff size: regular    Questioned patient about current smoking habits.  Pt. has never smoked.          The following HM Due: NONE      The following patient reported/Care Every where data was sent to:  P ABSTRACT QUALITY INITIATIVES [[id number]]        N/a      Maura Patel MA                " Yes

## 2022-03-12 ENCOUNTER — HEALTH MAINTENANCE LETTER (OUTPATIENT)
Age: 47
End: 2022-03-12

## 2022-03-23 DIAGNOSIS — Z30.011 ENCOUNTER FOR INITIAL PRESCRIPTION OF CONTRACEPTIVE PILLS: ICD-10-CM

## 2022-03-23 RX ORDER — NORGESTIMATE AND ETHINYL ESTRADIOL 0.25-0.035
KIT ORAL
Qty: 84 TABLET | Refills: 3 | Status: SHIPPED | OUTPATIENT
Start: 2022-03-23 | End: 2022-04-18

## 2022-04-18 ENCOUNTER — OFFICE VISIT (OUTPATIENT)
Dept: MIDWIFE SERVICES | Facility: CLINIC | Age: 47
End: 2022-04-18
Payer: COMMERCIAL

## 2022-04-18 VITALS
SYSTOLIC BLOOD PRESSURE: 132 MMHG | WEIGHT: 171 LBS | BODY MASS INDEX: 28.46 KG/M2 | DIASTOLIC BLOOD PRESSURE: 80 MMHG | HEART RATE: 107 BPM | TEMPERATURE: 97.7 F

## 2022-04-18 DIAGNOSIS — M79.662 PAIN OF LEFT LOWER LEG: ICD-10-CM

## 2022-04-18 DIAGNOSIS — Z30.011 ENCOUNTER FOR INITIAL PRESCRIPTION OF CONTRACEPTIVE PILLS: ICD-10-CM

## 2022-04-18 DIAGNOSIS — E03.2 HYPOTHYROIDISM DUE TO MEDICATION: ICD-10-CM

## 2022-04-18 DIAGNOSIS — E06.3 HYPOTHYROIDISM DUE TO HASHIMOTO'S THYROIDITIS: ICD-10-CM

## 2022-04-18 DIAGNOSIS — Z13.220 SCREENING FOR LIPID DISORDERS: ICD-10-CM

## 2022-04-18 DIAGNOSIS — Z01.419 WELL WOMAN EXAM: Primary | ICD-10-CM

## 2022-04-18 PROCEDURE — 99396 PREV VISIT EST AGE 40-64: CPT | Performed by: ADVANCED PRACTICE MIDWIFE

## 2022-04-18 PROCEDURE — 36415 COLL VENOUS BLD VENIPUNCTURE: CPT | Performed by: ADVANCED PRACTICE MIDWIFE

## 2022-04-18 PROCEDURE — 84443 ASSAY THYROID STIM HORMONE: CPT | Performed by: ADVANCED PRACTICE MIDWIFE

## 2022-04-18 RX ORDER — LEVOTHYROXINE SODIUM 137 UG/1
137 TABLET ORAL DAILY
Qty: 90 TABLET | Refills: 4 | Status: SHIPPED | OUTPATIENT
Start: 2022-04-18 | End: 2023-06-12

## 2022-04-18 RX ORDER — NORGESTIMATE AND ETHINYL ESTRADIOL 0.25-0.035
1 KIT ORAL DAILY
Qty: 84 TABLET | Refills: 4 | Status: SHIPPED | OUTPATIENT
Start: 2022-04-18 | End: 2023-07-21

## 2022-04-18 NOTE — PATIENT INSTRUCTIONS
Mariah,     It was so nice to see you today. Here are some recommendations:    1) Call Physical therapy to make an appointment for your knee. If your pain is not improved, I recommend seeing a general practitioner to evaluate it. The family practice group at [facility] is excellent. They are located on the 6th floor.     2) Supplement with Vitamin D during the winter months. I recommend 2,000 international unit(s) daily.     3) For sleep. Take 1-5g melatonin 2 hours before bedtime. Also try over the counter unisom (1/2 or full tablet) as needed.    4) Schedule your mammogram.    5) Make an appointment at the [facility] lab for a cholesterol check some morning after you have fasted for 8 hours.

## 2022-04-18 NOTE — PROGRESS NOTES
Mariah is a 47 year old  female who presents for annual exam.     Menses are regular q 28-30 days and normal lasting 5 days.  Menses flow: normal.  Patient's last menstrual period was 2022.. Using pills for contraception.  She is not currently considering pregnancy.  Besides routine health maintenance,  she would like to discuss Refill medications Thyriod. Since Last year she has been taking an moderate dose OCP which has made her period manageable and she would like to continue with this for another year at least. She also was diagnosed with colitis and is unable to take ibuprofen now.   She struggles with left lower leg pain which is interrmittent and occurs several times a week. It limits her activities when it occurs but when it is not occurring she can hike and be quite active. We discussed physical therapy or a evaluation by family practice. She will start with physical therapy.     She also struggles with sleep. This has been a decades long struggle. She is currently taking melatonin and noticed very mild improvement. Wants to know how much to take and if there is any other strategy for managing insomnia.   GYNECOLOGIC HISTORY:  Menarche: 13  Age at first intercourse: 18 Number of lifetime partners: 6  Mariah is sexually active with 1male partner(s) and is currently in monogamous relationship.    History sexually transmitted infections:Herpes  STI testing offered?  Declined  ARABELLA exposure: No  History of abnormal Pap smear: YES - updated in Problem List and Health Maintenance accordingly  Family history of breast CA: No  Family history of uterine/ovarian CA: No    Family history of colon CA: No    HEALTH MAINTENANCE:  Cholesterol: (  Cholesterol   Date Value Ref Range Status   2021 246 (H) <200 mg/dL Final     Comment:     Desirable:       <200 mg/dl   2017 185 <200 mg/dL Final    History of abnormal lipids: No  Mammo: 2017 . History of abnormal Mammo: No.  Regular Self Breast  Exams: Yes  Calcium/Vitamin D intake: source:  dairy, dietary supplement(s) Adequate? Yes  TSH: (  TSH   Date Value Ref Range Status   2021 0.77 0.40 - 4.00 mU/L Final    )  Pap; (  Lab Results   Component Value Date    PAP NIL 2017    PAP NIL 2013    PAP NIL 2012    )    HISTORY:  OB History    Para Term  AB Living   2 2 2 0 0 2   SAB IAB Ectopic Multiple Live Births   0 0 0 0 2      # Outcome Date GA Lbr Curry/2nd Weight Sex Delivery Anes PTL Lv   2 Term 10/11/13 42w1d 01:45 / 00:28 4.536 kg (10 lb) M Vag-Spont Local N JACEK      Birth Comments: IOL for Postdates--one miso only      Name: Manav Palomo      Apgar1: 7  Apgar5: 9   1 Term 06 41w0d  4.338 kg (9 lb 9 oz) M    JACEK      Name: Albert     Past Medical History:   Diagnosis Date     History of colonoscopy 2019    had 3 polyps removed     Hx of colposcopy with cervical biopsy ,     Hypothyroid      Papanicolaou smear of cervix with low grade squamous intraepithelial lesion (LGSIL) ', '10    colp JOSE I, NIL     Serrated adenoma of colon 2019    3 SSAs, largest was 10mm- recommend surveillance colonosopy in 3 years (2022)     Past Surgical History:   Procedure Laterality Date     COLONOSCOPY N/A 2019    Procedure: COLONOSCOPY, WITH POLYPECTOMY AND BIOPSY;  Surgeon: Romel Shell MD;  Location: [facility] OR     COLONOSCOPY N/A 1/10/2022    Procedure: COLONOSCOPY, WITH POLYPECTOMY AND BIOPSY;  Surgeon: Romel Shell MD;  [place] [facility] [place]     [facility] [place]    normal     radioactive iodine       ZZC NONSPECIFIC PROCEDURE      removal of cyst from foot     Family History   Problem Relation Age of Onset     Hypertension Mother      Genitourinary Problems Mother         Kidney stones     Coronary Artery Disease Mother         3 stints placed     Depression Mother      Cerebrovascular Disease Mother      Genitourinary Problems Father      Thyroid Disease Father      Diabetes  Maternal Grandmother      Dementia Maternal Grandmother      Hypertension Maternal Grandfather      Cerebrovascular Disease Maternal Grandfather      JANELLE.A.NICANOR. Maternal Grandfather         MI     C.A.NICANOR. Paternal Grandfather         MI     Diabetes Paternal Grandfather      Genitourinary Problems Sister         kidney stones     Social History     Socioeconomic History     Marital status:      Spouse name: None     Number of children: 1     Years of education: None     Highest education level: None   Occupational History     Employer: NONE    Tobacco Use     Smoking status: Never Smoker     Smokeless tobacco: Never Used   Substance and Sexual Activity     Alcohol use: Yes     Alcohol/week: 0.0 standard drinks     Comment: very rarely     Drug use: No     Sexual activity: Yes     Partners: Male     Birth control/protection: Male Surgical   Other Topics Concern     Parent/sibling w/ CABG, MI or angioplasty before 65F 55M? No   Social History Narrative    Caffeine intake/servings daily - 1-2 of tea/day    Calcium intake/servings daily - 2    Exercise 4 times weekly - describe yoga, pilates    Sunscreen used - Yes    Seatbelts used - Yes    Guns stored in the home - No    Self Breast Exam - Yes    Pap test up to date -  Yes    Eye exam up to date -  No    Dental exam up to date -  Yes    DEXA scan up to date -  Not Applicable    Flex Sig/Colonoscopy up to date -  Not Applicable    Mammography up to date -  Not Applicable    Immunizations reviewed and up to date - Yes    Abuse: Current or Past (Physical, Sexual or Emotional) - No    Do you feel safe in your environment - Yes    Do you cope well with stress - Yes    Do you suffer from insomnia - Yes    Last updated by: RIKKI GARCIA  2/11/2013                               Current Outpatient Medications:      levothyroxine (SYNTHROID/LEVOTHROID) 137 MCG tablet, TAKE 1 TABLET BY MOUTH EVERY DAY, Disp: 90 tablet, Rfl: 0     DAVIS 0.25-35 MG-MCG tablet, TAKE 1 TABLET  BY MOUTH EVERY DAY, Disp: 84 tablet, Rfl: 3     Allergies   Allergen Reactions     Methimazole Itching       Past medical, surgical, social and family history were reviewed and updated in EPIC.    ROS:   C:     NEGATIVE for fever, chills, change in weight  I:       NEGATIVE for worrisome rashes, moles or lesions  E:     NEGATIVE for vision changes or irritation  E/M: NEGATIVE for ear, mouth and throat problems  R:     NEGATIVE for significant cough or SOB  CV:   NEGATIVE for chest pain, palpitations or peripheral edema  GI:     NEGATIVE for nausea, abdominal pain, heartburn, or change in bowel habits  :   NEGATIVE for frequency, dysuria, hematuria, vaginal discharge, or irregular bleeding  M:     NEGATIVE for significant arthralgias or myalgia  N:      NEGATIVE for weakness, dizziness or paresthesias  E:      NEGATIVE for temperature intolerance, skin/hair changes  P:      NEGATIVE for changes in mood or affect.    EXAM:  /80   Pulse 107   Temp 97.7  F (36.5  C)   Wt 77.6 kg (171 lb)   LMP 04/07/2022   Breastfeeding No   BMI 28.46 kg/m     BMI: Body mass index is 28.46 kg/m .  Constitutional: healthy, alert and no distress  Head: Normocephalic. No masses, lesions, tenderness or abnormalities  Neck: Neck supple. Trachea midline. No adenopathy. Thyroid symmetric, normal size.   Cardiovascular: RRR.   Respiratory: Negative.   Breast: No nodularity, asymmetry or nipple discharge bilaterally.  Gastrointestinal: Abdomen soft, non-tender, non-distended. No masses, organomegaly.  :pelvic exam not needed  Musculoskeletal: extremities normal  Skin: no suspicious lesions or rashes  Psychiatric: Affect appropriate, cooperative,mentation appears normal.     COUNSELING:   Reviewed preventive health counseling, as reflected in patient instructions       Regular exercise       Healthy diet/nutrition       Contraception   reports that she has never smoked. She has never used smokeless tobacco.    Body mass index is  28.46 kg/m .    FRAX Risk Assessment    ASSESSMENT:  47 year old female with satisfactory annual exam  (E03.8,  E06.3) Hypothyroidism due to Hashimoto's thyroiditis  (primary encounter diagnosis)  Comment:   Plan: TSH with free T4 reflex            (E03.2) Hypothyroidism due to medication  Comment:   Plan: levothyroxine (SYNTHROID/LEVOTHROID) 137 MCG         tablet            (Z30.011) Encounter for initial prescription of contraceptive pills  Comment:   Plan: norgestimate-ethinyl estradiol (DAVIS) 0.25-35         MG-MCG tablet            (M79.662) Pain of left lower leg  Comment:   Plan: Physical Therapy Referral            (Z13.220) Screening for lipid disorders  Comment:   Plan: Lipid Profile (Chol, Trig, HDL, LDL calc),         Lipid panel reflex to direct LDL Fasting              Return to the clinic in one year for your next annual appointment or sooner with concerns.    Aiyana Doherty CNM

## 2022-04-19 LAB — TSH SERPL DL<=0.005 MIU/L-ACNC: 0.47 MU/L (ref 0.4–4)

## 2022-04-19 NOTE — RESULT ENCOUNTER NOTE
Dear aMriah,    Your test results are attached below. Your thyroid is in goal and you are good with your prescription for another year.  If you have any questions, please contact me via [employer] or you can call our office at [phone].    Aiyana Bond, HAJA, APRN HAJA, CNM

## 2022-05-11 ENCOUNTER — ANCILLARY PROCEDURE (OUTPATIENT)
Dept: MAMMOGRAPHY | Facility: CLINIC | Age: 47
End: 2022-05-11
Attending: ADVANCED PRACTICE MIDWIFE
Payer: COMMERCIAL

## 2022-05-11 DIAGNOSIS — Z12.31 VISIT FOR SCREENING MAMMOGRAM: ICD-10-CM

## 2022-05-11 PROCEDURE — 77067 SCR MAMMO BI INCL CAD: CPT | Mod: TC | Performed by: RADIOLOGY

## 2022-05-11 PROCEDURE — 77063 BREAST TOMOSYNTHESIS BI: CPT | Mod: TC | Performed by: RADIOLOGY

## 2022-05-12 ENCOUNTER — HOSPITAL ENCOUNTER (OUTPATIENT)
Dept: MAMMOGRAPHY | Facility: CLINIC | Age: 47
Discharge: HOME | End: 2022-05-12
Attending: ADVANCED PRACTICE MIDWIFE | Admitting: ADVANCED PRACTICE MIDWIFE
Payer: COMMERCIAL

## 2022-05-12 DIAGNOSIS — R92.0 MICROCALCIFICATION OF BREAST: ICD-10-CM

## 2022-05-12 PROCEDURE — 77065 DX MAMMO INCL CAD UNI: CPT | Mod: RT

## 2022-05-18 ENCOUNTER — HOSPITAL ENCOUNTER (OUTPATIENT)
Dept: MAMMOGRAPHY | Facility: CLINIC | Age: 47
Discharge: HOME | End: 2022-05-18
Attending: ADVANCED PRACTICE MIDWIFE
Payer: COMMERCIAL

## 2022-05-18 DIAGNOSIS — R92.0 MICROCALCIFICATION OF BREAST: ICD-10-CM

## 2022-05-18 PROCEDURE — 250N000009 HC RX 250: Performed by: ADVANCED PRACTICE MIDWIFE

## 2022-05-18 PROCEDURE — 999N000065 MA POST PROCEDURE RIGHT

## 2022-05-18 PROCEDURE — 88342 IMHCHEM/IMCYTCHM 1ST ANTB: CPT | Mod: TC | Performed by: ADVANCED PRACTICE MIDWIFE

## 2022-05-18 PROCEDURE — 272N000715 MA STEREOTACTIC BREAST BIOPSY VACUUM RT

## 2022-05-18 RX ORDER — LIDOCAINE HYDROCHLORIDE AND EPINEPHRINE 10; 10 MG/ML; UG/ML
10 INJECTION, SOLUTION INFILTRATION; PERINEURAL ONCE
Status: COMPLETED | OUTPATIENT
Start: 2022-05-18 | End: 2022-05-18

## 2022-05-18 RX ADMIN — LIDOCAINE HYDROCHLORIDE AND EPINEPHRINE 10 ML: 10; 10 INJECTION, SOLUTION INFILTRATION; PERINEURAL at 10:24

## 2022-05-18 RX ADMIN — LIDOCAINE HYDROCHLORIDE 5 ML: 10 INJECTION, SOLUTION EPIDURAL; INFILTRATION; INTRACAUDAL; PERINEURAL at 10:24

## 2022-05-18 NOTE — DISCHARGE INSTRUCTIONS

## 2022-05-20 ENCOUNTER — TELEPHONE (OUTPATIENT)
Dept: MAMMOGRAPHY | Facility: CLINIC | Age: 47
End: 2022-05-20

## 2022-05-20 LAB
PATH REPORT.COMMENTS IMP SPEC: NORMAL
PATH REPORT.FINAL DX SPEC: NORMAL
PATH REPORT.GROSS SPEC: NORMAL
PATH REPORT.MICROSCOPIC SPEC OTHER STN: NORMAL
PATH REPORT.MICROSCOPIC SPEC OTHER STN: NORMAL
PATH REPORT.RELEVANT HX SPEC: NORMAL
PHOTO IMAGE: NORMAL

## 2022-05-20 PROCEDURE — 88342 IMHCHEM/IMCYTCHM 1ST ANTB: CPT | Mod: 26 | Performed by: PATHOLOGY

## 2022-05-20 PROCEDURE — 88305 TISSUE EXAM BY PATHOLOGIST: CPT | Mod: 26 | Performed by: PATHOLOGY

## 2022-05-20 NOTE — TELEPHONE ENCOUNTER
After review by Breast Center Radiologist, Dr. Cecily RamosMariah was called,  verified, and given her 2022 Right Breast Biopsy results (Fibrocystic Changes with benign calcifications) and recommended Follow up (Annual Screening Mammograms).   Patient denies any concerns with the biopsy site. Steri Strips Intact. Ordering provider was informed of the results and follow up plan.  I encouraged her to contact her doctor with any further breast concerns.  Patient verbalized understanding and agrees with the plan of care.      [facility]  DonyanicolaMariah benítez [id number]  F, 1975  Surgical Pathology Report (Final result) [id number]  Authorizing Provider: Aiyana Doherty APRN CNM  Ordering Provider: Aiyana Doherty APRN CNM  Ordering Location: [facility]  Collected: 2022 10:20 AM  Pathologist: Ida Tapia MD Received: 2022 12:04 PM  .  Specimens  A Breast, Right  .  .  Final Diagnosis  Right breast, 0.3 cm grouping of microcalcifications, 11:00, 1.5 cm from nipple, stereotactic vacuum assisted 9 gauge  needle core biopsies:  - Focal fibrocystic change.  - Focal columnar cell change.  - A single focus of intraductal microcalcification.  - Negative for atypia and malignancy.  Electronically signed by Ida Tapia MD on 2022 at 1:48 PM        Albertina Ochoa RN BSN  Procedure Nurse  [facility]  [phone]

## 2022-12-26 ENCOUNTER — HEALTH MAINTENANCE LETTER (OUTPATIENT)
Age: 47
End: 2022-12-26

## 2023-06-02 ENCOUNTER — HEALTH MAINTENANCE LETTER (OUTPATIENT)
Age: 48
End: 2023-06-02

## 2023-07-21 DIAGNOSIS — Z30.011 ENCOUNTER FOR INITIAL PRESCRIPTION OF CONTRACEPTIVE PILLS: ICD-10-CM

## 2023-07-21 RX ORDER — NORGESTIMATE AND ETHINYL ESTRADIOL 0.25-0.035
1 KIT ORAL DAILY
Qty: 84 TABLET | Refills: 0 | Status: SHIPPED | OUTPATIENT
Start: 2023-07-21 | End: 2023-12-05

## 2023-07-21 NOTE — TELEPHONE ENCOUNTER
"Requested Prescriptions   Pending Prescriptions Disp Refills     DAVIS 0.25-35 MG-MCG tablet [Pharmacy Med Name: DAVSI 0.25-0.035 MG TABLET] 84 tablet 4     Sig: TAKE 1 TABLET BY MOUTH EVERY DAY       Contraceptives Protocol Failed - 7/21/2023 12:37 AM        Failed - Recent (12 mo) or future (30 days) visit within the authorizing provider's specialty     Patient has had an office visit with the authorizing provider or a provider within the authorizing providers department within the previous 12 mos or has a future within next 30 days. See \"Patient Info\" tab in inbasket, or \"Choose Columns\" in Meds & Orders section of the refill encounter.              Passed - Patient is not a current smoker if age is 35 or older        Passed - Medication is active on med list        Passed - No active pregnancy on record        Passed - No positive pregnancy test in past 12 months           Last Written Prescription Date:  4/18/22  Last Fill Quantity: 84,  # refills: 4   Last office visit: 4/18/2022 ; last virtual visit: Visit date not found with prescribing provider:  Aiyana Doherty  Future Office Visit:    None    Medication is being filled for 1 time refill only due to:  Patient needs to be seen because it has been more than one year since last visit.   Pricila Nelson RN on 7/21/2023 at 8:43 AM            "

## 2023-09-17 ENCOUNTER — HEALTH MAINTENANCE LETTER (OUTPATIENT)
Age: 48
End: 2023-09-17

## 2023-10-12 DIAGNOSIS — Z30.011 ENCOUNTER FOR INITIAL PRESCRIPTION OF CONTRACEPTIVE PILLS: ICD-10-CM

## 2023-10-12 RX ORDER — NORGESTIMATE AND ETHINYL ESTRADIOL 0.25-0.035
KIT ORAL
Qty: 84 TABLET | Refills: 0 | OUTPATIENT
Start: 2023-10-12

## 2023-10-12 NOTE — TELEPHONE ENCOUNTER
"Requested Prescriptions   Pending Prescriptions Disp Refills    DAVIS 0.25-35 MG-MCG tablet [Pharmacy Med Name: DAVIS 0.25-0.035 MG TABLET] 84 tablet 0     Sig: TAKE 1 TABLET BY MOUTH DAILY APPOINTMENT NEEDED FOR FURTHER REFILLS       Contraceptives Protocol Failed - 10/12/2023 11:32 AM        Failed - Recent (12 mo) or future (30 days) visit within the authorizing provider's specialty     Patient has had an office visit with the authorizing provider or a provider within the authorizing providers department within the previous 12 mos or has a future within next 30 days. See \"Patient Info\" tab in inbasket, or \"Choose Columns\" in Meds & Orders section of the refill encounter.              Passed - Patient is not a current smoker if age is 35 or older        Passed - Medication is active on med list        Passed - No active pregnancy on record        Passed - No positive pregnancy test in past 12 months           Last Written Prescription Date:  7/21/23  Last Fill Quantity: 84,  # refills: 0   Last office visit: 4/18/2022 ; last virtual visit: Visit date not found with prescribing provider:  Aiyana Doherty CNM   Future Office Visit:    12/5/23    Pt does not need refill for 2 months, will get refill at upcoming appt.  Pricila Nelson RN on 10/12/2023 at 12:30 PM          "

## 2023-10-23 ENCOUNTER — OFFICE VISIT (OUTPATIENT)
Dept: FAMILY MEDICINE | Facility: CLINIC | Age: 48
End: 2023-10-23
Payer: COMMERCIAL

## 2023-10-23 VITALS
HEART RATE: 95 BPM | WEIGHT: 182 LBS | TEMPERATURE: 97.3 F | BODY MASS INDEX: 29.25 KG/M2 | HEIGHT: 66 IN | SYSTOLIC BLOOD PRESSURE: 131 MMHG | DIASTOLIC BLOOD PRESSURE: 75 MMHG | RESPIRATION RATE: 16 BRPM | OXYGEN SATURATION: 97 %

## 2023-10-23 DIAGNOSIS — E03.9 HYPOTHYROIDISM, UNSPECIFIED TYPE: ICD-10-CM

## 2023-10-23 DIAGNOSIS — J02.9 ACUTE SORE THROAT: Primary | ICD-10-CM

## 2023-10-23 DIAGNOSIS — Z12.4 CERVICAL CANCER SCREENING: ICD-10-CM

## 2023-10-23 DIAGNOSIS — Z12.31 VISIT FOR SCREENING MAMMOGRAM: ICD-10-CM

## 2023-10-23 LAB
DEPRECATED S PYO AG THROAT QL EIA: NEGATIVE
GROUP A STREP BY PCR: NOT DETECTED
MONOCYTES NFR BLD AUTO: NEGATIVE %

## 2023-10-23 PROCEDURE — 87635 SARS-COV-2 COVID-19 AMP PRB: CPT | Performed by: FAMILY MEDICINE

## 2023-10-23 PROCEDURE — 99203 OFFICE O/P NEW LOW 30 MIN: CPT | Performed by: FAMILY MEDICINE

## 2023-10-23 PROCEDURE — 86308 HETEROPHILE ANTIBODY SCREEN: CPT | Performed by: FAMILY MEDICINE

## 2023-10-23 PROCEDURE — 87651 STREP A DNA AMP PROBE: CPT | Performed by: FAMILY MEDICINE

## 2023-10-23 PROCEDURE — 36415 COLL VENOUS BLD VENIPUNCTURE: CPT | Performed by: FAMILY MEDICINE

## 2023-10-23 ASSESSMENT — PAIN SCALES - GENERAL: PAINLEVEL: MILD PAIN (2)

## 2023-10-23 NOTE — PROGRESS NOTES
1. Acute sore throat  This is a 47 yo female with ear ache/sore throat.  Son had mono last month so she is concerned that she may have something that needs treating.  Mono, Strep, COVID labs all ordered.  Mono and strep negative today - reviewed with patient.  Doubt COVID.  (Patient works from home, so can isolate until results known).  Discussed symptomatic cares.    - Mononucleosis screen; Future  - Streptococcus A Rapid Screen w/Reflex to PCR - Clinic Collect  - Symptomatic COVID-19 Virus (Coronavirus) by PCR; Future  - Mononucleosis screen  - Symptomatic COVID-19 Virus (Coronavirus) by PCR Nose  - Group A Streptococcus PCR Throat Swab    2. Hypothyroid  H/o hypothyroidism - no tenderness overlying thyroid gland.  Doubt this contributes to current symptoms.      3. Visit for screening mammogram  Due for breast cancer screening - reminded patient.      4. Cervical cancer screening  Due for cervix cancer screening - reminded patient.        Lloyd Lemus is a 48 year old, presenting for the following health issues:  Ear Problem and Throat Problem        10/23/2023    10:04 AM   Additional Questions   Roomed by oskar       Left sided sore throat  Ear pain  Not bad during day - but worse during night    Son had mono last month       History of Present Illness       Reason for visit:  Sore throat ear ache gereral aches 4 days  Symptom onset:  3-7 days ago  Symptoms include:  Left ear pain, sore throat, aches  Symptom intensity:  Severe  Symptom progression:  Staying the same  Had these symptoms before:  No  What makes it worse:  Activity  What makes it better:  Sleep but trouble sleeping    She eats 4 or more servings of fruits and vegetables daily.She consumes 1 sweetened beverage(s) daily.She exercises with enough effort to increase her heart rate 20 to 29 minutes per day.  She exercises with enough effort to increase her heart rate 3 or less days per week.   She is taking medications regularly.    "      Review of Systems   Constitutional:  Negative for chills and fever.   HENT:  Positive for ear pain and sore throat (mostly left sided). Negative for congestion.    Eyes:  Negative for pain and visual disturbance.   Respiratory:  Negative for cough and shortness of breath.    Cardiovascular:  Negative for chest pain, palpitations and peripheral edema.   Gastrointestinal:  Negative for abdominal pain, constipation and diarrhea.   Endocrine: Negative for polyuria.   Genitourinary:  Negative for dysuria, frequency and vaginal discharge.   Musculoskeletal:  Negative for arthralgias and myalgias.   Skin:  Negative for rash.   Allergic/Immunologic: Negative.    Neurological:  Negative for dizziness, weakness, headaches and paresthesias.   Hematological:  Does not bruise/bleed easily.   Psychiatric/Behavioral: Negative.              Objective    /75 (BP Location: Right arm, Patient Position: Sitting, Cuff Size: Adult Regular)   Pulse 95   Temp 97.3  F (36.3  C) (Temporal)   Resp 16   Ht 1.67 m (5' 5.75\")   Wt 82.6 kg (182 lb)   LMP 10/09/2023 (Approximate)   SpO2 97%   BMI 29.60 kg/m    Body mass index is 29.6 kg/m .  Physical Exam  Vitals reviewed.   Constitutional:       General: She is not in acute distress.     Appearance: Normal appearance.   HENT:      Head: Normocephalic.      Right Ear: Tympanic membrane, ear canal and external ear normal.      Left Ear: Tympanic membrane, ear canal and external ear normal.      Mouth/Throat:      Mouth: Mucous membranes are moist.      Pharynx: Posterior oropharyngeal erythema (mild) present.   Eyes:      Extraocular Movements: Extraocular movements intact.      Conjunctiva/sclera: Conjunctivae normal.      Pupils: Pupils are equal, round, and reactive to light.   Cardiovascular:      Rate and Rhythm: Normal rate and regular rhythm.      Pulses: Normal pulses.      Heart sounds: Normal heart sounds. No murmur heard.  Pulmonary:      Effort: Pulmonary effort is " normal.      Breath sounds: Normal breath sounds.   Abdominal:      Palpations: Abdomen is soft. There is no mass.      Tenderness: There is no abdominal tenderness. There is no guarding or rebound.   Musculoskeletal:         General: No deformity. Normal range of motion.      Cervical back: Normal range of motion and neck supple. Tenderness (left anterior cervical nodes) present.   Lymphadenopathy:      Cervical: No cervical adenopathy.   Skin:     General: Skin is warm and dry.   Neurological:      General: No focal deficit present.      Mental Status: She is alert.   Psychiatric:         Mood and Affect: Mood normal.         Behavior: Behavior normal.         Prior to immunization administration, verified patients identity using patient s name and date of birth. Please see Immunization Activity for additional information.     Screening Questionnaire for Adult Immunization    Are you sick today?   Yes   Do you have allergies to medications, food, a vaccine component or latex?   Yes   Have you ever had a serious reaction after receiving a vaccination?   No   Do you have a long-term health problem with heart, lung, kidney, or metabolic disease (e.g., diabetes), asthma, a blood disorder, no spleen, complement component deficiency, a cochlear implant, or a spinal fluid leak?  Are you on long-term aspirin therapy?   No   Do you have cancer, leukemia, HIV/AIDS, or any other immune system problem?   No   Do you have a parent, brother, or sister with an immune system problem?   No   In the past 3 months, have you taken medications that affect  your immune system, such as prednisone, other steroids, or anticancer drugs; drugs for the treatment of rheumatoid arthritis, Crohn s disease, or psoriasis; or have you had radiation treatments?   No   Have you had a seizure, or a brain or other nervous system problem?   No   During the past year, have you received a transfusion of blood or blood    products, or been given immune  (gamma) globulin or antiviral drug?   No   For women: Are you pregnant or is there a chance you could become       pregnant during the next month?   No   Have you received any vaccinations in the past 4 weeks?   Yes     Immunization questionnaire was positive for at least one answer.  Notified .           Screening performed by Lydia Anthony MA on 10/23/2023 at 10:08 AM.         Results for orders placed or performed in visit on 10/23/23   Mononucleosis screen     Status: Normal   Result Value Ref Range    Mononucleosis Screen Negative Negative   Symptomatic COVID-19 Virus (Coronavirus) by PCR Nose     Status: Normal    Specimen: Nose; Swab   Result Value Ref Range    SARS CoV2 PCR Negative Negative    Narrative    Testing was performed using the alessandro SARS-CoV-2 assay on the alessandro  [employer] System. This test should be ordered for the detection of  SARS-CoV-2 in individuals who meet SARS-CoV-2 clinical and/or  epidemiological criteria. Test performance is unknown in asymptomatic  patients. This test is for in vitro diagnostic use under the FDA EUA  for laboratories certified under CLIA to perform high and/or moderate  complexity testing. This test has not been FDA cleared or approved. A  negative result does not rule out the presence of PCR inhibitors in  the specimen or target RNA in concentration below the limit of  detection for the assay. The possibility of a false negative should  be considered if the patient's recent exposure or clinical  presentation suggests COVID-19. This test was validated by the [facility]. This  laboratory is certified under the Clinical Laboratory Improvement  Amendments of 1988 (CLIA-88) as qualified to perform high and/or  moderate complexity laboratory testing.   Streptococcus A Rapid Screen w/Reflex to PCR - Clinic Collect     Status: Normal    Specimen: Throat; Swab   Result Value Ref Range    Group A Strep antigen Negative Negative    Group A Streptococcus PCR Throat Swab     Status: Normal    Specimen: Throat; Swab   Result Value Ref Range    Group A strep by PCR Not Detected Not Detected    Narrative    The Xpert Xpress Strep A test, performed on the [employer], is a rapid, qualitative in vitro diagnostic test for the detection of Streptococcus pyogenes (Group A ß-hemolytic Streptococcus, Strep A) in throat swab specimens from patients with signs and symptoms of pharyngitis. The Xpert Xpress Strep A test can be used as an aid in the diagnosis of Group A Streptococcal pharyngitis. The assay is not intended to monitor treatment for Group A Streptococcus infections. The Xpert Xpress Strep A test utilizes an automated real-time polymerase chain reaction (PCR) to detect Streptococcus pyogenes DNA.

## 2023-10-24 LAB — SARS-COV-2 RNA RESP QL NAA+PROBE: NEGATIVE

## 2023-10-29 ASSESSMENT — ENCOUNTER SYMPTOMS
ARTHRALGIAS: 0
WEAKNESS: 0
FEVER: 0
PARESTHESIAS: 0
DIARRHEA: 0
PALPITATIONS: 0
HEADACHES: 0
ALLERGIC/IMMUNOLOGIC NEGATIVE: 1
DIZZINESS: 0
SORE THROAT: 1
MYALGIAS: 0
SHORTNESS OF BREATH: 0
CONSTIPATION: 0
CHILLS: 0
PSYCHIATRIC NEGATIVE: 1
ABDOMINAL PAIN: 0
COUGH: 0
DYSURIA: 0
EYE PAIN: 0
FREQUENCY: 0
BRUISES/BLEEDS EASILY: 0

## 2023-12-05 ENCOUNTER — LAB (OUTPATIENT)
Dept: LAB | Facility: CLINIC | Age: 48
End: 2023-12-05
Attending: NURSE PRACTITIONER
Payer: COMMERCIAL

## 2023-12-05 ENCOUNTER — OFFICE VISIT (OUTPATIENT)
Dept: OBGYN | Facility: CLINIC | Age: 48
End: 2023-12-05
Attending: NURSE PRACTITIONER
Payer: COMMERCIAL

## 2023-12-05 VITALS
HEART RATE: 75 BPM | BODY MASS INDEX: 28.93 KG/M2 | SYSTOLIC BLOOD PRESSURE: 134 MMHG | DIASTOLIC BLOOD PRESSURE: 82 MMHG | WEIGHT: 180 LBS | HEIGHT: 66 IN

## 2023-12-05 DIAGNOSIS — F43.22 ADJUSTMENT DISORDER WITH ANXIOUS MOOD: ICD-10-CM

## 2023-12-05 DIAGNOSIS — E03.9 HYPOTHYROIDISM, UNSPECIFIED TYPE: ICD-10-CM

## 2023-12-05 DIAGNOSIS — Z30.41 SURVEILLANCE FOR BIRTH CONTROL, ORAL CONTRACEPTIVES: ICD-10-CM

## 2023-12-05 DIAGNOSIS — E03.2 HYPOTHYROIDISM DUE TO MEDICATION: ICD-10-CM

## 2023-12-05 DIAGNOSIS — Z00.00 ANNUAL VISIT FOR GENERAL ADULT MEDICAL EXAMINATION WITHOUT ABNORMAL FINDINGS: Primary | ICD-10-CM

## 2023-12-05 DIAGNOSIS — Z13.31 SCREENING FOR DEPRESSION: ICD-10-CM

## 2023-12-05 DIAGNOSIS — Z13.220 SCREENING FOR LIPOID DISORDERS: ICD-10-CM

## 2023-12-05 DIAGNOSIS — Z00.00 ANNUAL VISIT FOR GENERAL ADULT MEDICAL EXAMINATION WITHOUT ABNORMAL FINDINGS: ICD-10-CM

## 2023-12-05 DIAGNOSIS — Z12.31 ENCOUNTER FOR SCREENING MAMMOGRAM FOR MALIGNANT NEOPLASM OF BREAST: ICD-10-CM

## 2023-12-05 LAB
CHOLEST SERPL-MCNC: 222 MG/DL
FASTING STATUS PATIENT QL REPORTED: NO
HDLC SERPL-MCNC: 88 MG/DL
LDLC SERPL CALC-MCNC: 107 MG/DL
NONHDLC SERPL-MCNC: 134 MG/DL
TRIGL SERPL-MCNC: 133 MG/DL
TSH SERPL DL<=0.005 MIU/L-ACNC: 0.57 UIU/ML (ref 0.3–4.2)

## 2023-12-05 PROCEDURE — 99386 PREV VISIT NEW AGE 40-64: CPT | Performed by: NURSE PRACTITIONER

## 2023-12-05 PROCEDURE — 36415 COLL VENOUS BLD VENIPUNCTURE: CPT

## 2023-12-05 PROCEDURE — 99213 OFFICE O/P EST LOW 20 MIN: CPT | Performed by: NURSE PRACTITIONER

## 2023-12-05 PROCEDURE — 84443 ASSAY THYROID STIM HORMONE: CPT

## 2023-12-05 PROCEDURE — 90715 TDAP VACCINE 7 YRS/> IM: CPT

## 2023-12-05 PROCEDURE — 80061 LIPID PANEL: CPT

## 2023-12-05 PROCEDURE — 90471 IMMUNIZATION ADMIN: CPT

## 2023-12-05 PROCEDURE — 250N000011 HC RX IP 250 OP 636

## 2023-12-05 RX ORDER — NORGESTIMATE AND ETHINYL ESTRADIOL 0.25-0.035
1 KIT ORAL DAILY
Qty: 84 TABLET | Refills: 3 | Status: SHIPPED | OUTPATIENT
Start: 2023-12-05

## 2023-12-05 RX ORDER — LEVOTHYROXINE SODIUM 137 UG/1
137 TABLET ORAL DAILY
Qty: 90 TABLET | Refills: 3 | Status: SHIPPED | OUTPATIENT
Start: 2023-12-05

## 2023-12-05 RX ORDER — SERTRALINE HYDROCHLORIDE 25 MG/1
25 TABLET, FILM COATED ORAL DAILY
Qty: 90 TABLET | Refills: 0 | Status: SHIPPED | OUTPATIENT
Start: 2023-12-05 | End: 2024-02-20 | Stop reason: DRUGHIGH

## 2023-12-05 NOTE — PROGRESS NOTES
"Progress Note    SUBJECTIVE:  Mariah Perera is an 48 year old, , who requests an Annual Preventive Exam.     Concerns today include:     Birth control refill: Using COCs for management for heavy menstrual bleeding. Menses are regular every 28-30 days and normal lasting 5 days. Menses flow: lighter. LMP 10/2023. Partner has a vasectomy. She is not currently considering pregnancy. Overall periods are getting lighter, does report some side effects of mild breast tenderness, but tolerable. No vaginal concerns today and no abnormal bleeding. No desire for STD screening today. Would like to have less menses in the span of a year and would be interested in an option to space out periods.     Mental Health: Feeling increased amounts of stress and anxiety symptoms. She feels that many life events are adding up and is interested in possible intervention due to excessive worry affecting quality of life. Has a son who is a senior in high school and awaiting college acceptance letters.  Son is on 150 mg of Sertraline daily. Sleep has been \"terrible\" uses 10 mg of melatonin almost every night, open to medications or options. She has constant change in sleep hygiene with  is gone half the month as an . Her 10 year old still wakes her up.  Denies urinary symptoms overnight, night sweats or hot flashes    Hx of Graves Disease, treated, now hypothyroidism. Due for TSH and med refill.    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   Diet: Pretty good and lots of veggies and fruits   Exercise: couple times a week, water aerobics and walking.   Vaccines: TDAP today, flu completed earlier this season.  Healthy/habits/lifestylemodifications: drinks 1-2 cups of coffee a day. No smoking or vaping, Alcohol use occasionally, no concerns for excessive intake, no IV or recreational drugs.    Last pap smear: 2020 NIL, HPV negative --> due     Mammogram " current: no (patient will schedule)  Last Mammogram: 5/23/22 needed biopsy  MA Stereotactic Breast Biopsy Vacuum Assist Right    Addendum Date: 5/23/2022    Pathology Results: Fibrocystic change, benign calcification. Please see full pathology report for further details. Results are concordant with imaging findings. Recommendation: Routine annual mammography. LAUREN CAGLE MD   SYSTEM ID:  [id number]    Result Date: 5/23/2022  Narrative: Stereotactic biopsy of the RIGHT breast. Comparisons: Mammogram dated 5/12/2022. HISTORY:    Calcifications requiring biopsy. FINDINGS: Procedure, risks, benefits and alternatives were discussed with the patient and the patient gave written and verbal consent. The patient was positioned for stereotactic biopsy. Aseptic technique was utilized. 1% lidocaine was used for skin anesthesia and 15 cc of 1% lidocaine with epinephrine were utilized for deep tissue anesthesia. Using stereotactic technique and an 9 gauge vacuum assisted biopsy needle, multiple specimens were obtained and images were archived. Less than 5 mL blood loss. Calcifications are seen in the biopsy specimen. Location: 11:00 1-2 cm from the nipple. Marker: HydroMark Pressure was held over this area for approximately 15 minutes. A dressing was placed and care instructions were discussed with the patient. Post biopsy mammogram demonstrates marker deployment.     MA Post Procedure Right    Addendum Date: 5/23/2022    Pathology Results: Fibrocystic change, benign calcification. Please see full pathology report for further details. Results are concordant with imaging findings. Recommendation: Routine annual mammography. LAUREN CAGLE MD   SYSTEM ID:  [id number]    Result Date: 5/23/2022  Narrative: Stereotactic biopsy of the RIGHT breast. Comparisons: Mammogram dated 5/12/2022. HISTORY:    Calcifications requiring biopsy. FINDINGS: Procedure, risks, benefits and alternatives were discussed with the patient and the patient gave  written and verbal consent. The patient was positioned for stereotactic biopsy. Aseptic technique was utilized. 1% lidocaine was used for skin anesthesia and 15 cc of 1% lidocaine with epinephrine were utilized for deep tissue anesthesia. Using stereotactic technique and an 9 gauge vacuum assisted biopsy needle, multiple specimens were obtained and images were archived. Less than 5 mL blood loss. Calcifications are seen in the biopsy specimen. Location: 11:00 1-2 cm from the nipple. Marker: HydroMark Pressure was held over this area for approximately 15 minutes. A dressing was placed and care instructions were discussed with the patient. Post biopsy mammogram demonstrates marker deployment.     MA Diagnostic Digital Right    Result Date: 5/12/2022  Narrative: EXAM: MA DIAGNOSTIC DIGITAL RIGHT LOCATION: [facility] DATE/TIME: 5/12/2022 11:40 AM INDICATION: Abnormal screening mammogram. 47-year-old female presents for imaging follow-up for right breast calcifications demonstrated on recent screening mammography. COMPARISON: 5/11/2022, 7/24/2017 MAMMOGRAPHIC FINDINGS: Right full-field and spot magnification digital diagnostic mammogram performed. The breasts are heterogeneously dense, which may obscure small masses. Images evaluated with the assistance of CAD. There are amorphous calcifications in a grouped distribution within the right breast at the 11:00 position, anterior depth. Some of these calcifications may layer on the orthogonal lateral view, however some maintained their amorphous morphology. These calcifications are new compared to the prior examination in 2017. The remaining right breast parenchyma is unremarkable.      Last Colonoscopy:  Results for orders placed or performed during the hospital encounter of 01/10/22   COLONOSCOPY   Result Value Ref Range    COLONOSCOPY       [facility]  [address] [place] [id number] [phone]     Endoscopy  Department  _______________________________________________________________________________  Patient Name: Mariah Perera       Procedure Date: 1/10/2022 12:50 PM  MRN: [id number]                       Account Number: [id number]  YOB: 1975              Admit Type: Outpatient  Age: 46                               Room: Pro 2  Gender: Female                        Note Status: Finalized  Attending MD: Romel Shell ,    Desire for the Cause: Time out was   completed.  Total Sedation Time:                    _______________________________________________________________________________     Procedure:             Colonoscopy  Indications:           High risk colon cancer surveillance: Personal history                          of colonic polyps  Providers:             Romel Shell, Tanvi Barajas RN  Patient Profile:       This is a 46 year old female with history of                           adenomatous polyps in 2019 who presents for                          surveillance colonoscopy. Also noting loose stools.                          Sister had polyps in her 40s. No family history of                          colon cancer.  Referring MD:          Aiyana Doherty  Medicines:             Midazolam 2 mg IV, Fentanyl 100 micrograms IV  Complications:         No immediate complications.  _______________________________________________________________________________  Procedure:             Pre-Anesthesia Assessment:                         - Prior to the procedure, a History and Physical was                          performed, and patient medications and allergies were                          reviewed. The patient is competent. The risks and                          benefits of the procedure and the sedation options and                          risks were discussed with the patient. All questions                          were answered and informed c onsent was obtained.                           Patient identification and proposed procedure were                          verified by the physician in the pre-procedure area.                          Mental Status Examination: alert and oriented. Airway                          Examination: normal oropharyngeal airway and neck                          mobility. Respiratory Examination: clear to                          auscultation. CV Examination: normal. Prophylactic                          Antibiotics: The patient does not require prophylactic                          antibiotics. Prior Anticoagulants: The patient has                          taken no anticoagulant or antiplatelet agents. ASA                          Grade Assessment: II - A patient with mild systemic                          disease. After reviewing the risks and benefits, the                          patient was deemed in satisfactory condition to                          undergo the procedure. The anesthesia mayelin n was to use                          moderate sedation / analgesia (conscious sedation).                          Immediately prior to administration of medications,                          the patient was re-assessed for adequacy to receive                          sedatives. The heart rate, respiratory rate, oxygen                          saturations, blood pressure, adequacy of pulmonary                          ventilation, and response to care were monitored                          throughout the procedure. The physical status of the                          patient was re-assessed after the procedure.                         After obtaining informed consent, the colonoscope was                          passed under direct vision. Throughout the procedure,                          the patient's blood pressure, pulse, and oxygen                          saturations were monitored continuously. The                          Colonoscope was introduced through the anus  and                           advanced to the terminal ileum. The colonoscopy was                          performed without difficulty. The patient tolerated                          the procedure well. The quality of the bowel                          preparation was evaluated using the BBPS (Wagner Bowel                          Preparation Scale) with scores of: Right Colon = 3,                          Transverse Colon = 3 and Left Colon = 3 (entire mucosa                          seen well with no residual staining, small fragments                          of stool or opaque liquid). The total BBPS score                          equals 9.                                                                                   Findings:       The perianal and digital rectal examinations were normal.       A 1 mm polyp was found in the ascending colon. The polyp was sessile.        The polyp was removed with a jumbo cold forceps. Resection and retrieval        were complete.       A few small-mouthed  diverticula were found in the recto-sigmoid colon.       The terminal ileum appeared normal.       Retroflexion in the right colon was performed.       There is no endoscopic evidence of inflammation in the entire colon.        Biopsies were taken with a cold forceps for histology.       The exam was otherwise without abnormality on direct and retroflexion        views.                                                                                   Moderate Sedation:       Moderate (conscious) sedation was administered by the endoscopy nurse        and supervised by the endoscopist. The following parameters were        monitored: oxygen saturation, heart rate, blood pressure, and response        to care. Total physician intraservice time was 24 minutes.  Impression:            One small polyp resected during today's exam. Biopsies                          taken to rule out microscopic colitis.                         -  One 1 mm polyp in the ascending colon, removed with                           a jumbo cold forceps. Resected and retrieved.                         - Diverticulosis in the recto-sigmoid colon.                         - The examined portion of the ileum was normal.  Recommendation:        - Discharge patient to home (with escort).                         - Resume previous diet.                         - Continue present medications.                         - Await pathology results.                         - Repeat colonoscopy in 5 years for surveillance.                         - Return to referring physician as previously                          scheduled.                         - If biopsies show microscopic colitis, recommend                          budesonide. If biopsies are normal, recommend trial of                          Metamucil.                                                                                     Electronically signed by: Romel Shell MD  ____________________  Romel Shell,   1/10/2022 1:50:11 PM  I was physical ly present for the entire viewing portion of the exam.  __________________________  Signature of teaching physician  Romel Shell  Number of Addenda: 0    Note Initiated On: 1/10/2022 12:50 PM  Scope In:  Scope Out:     Colonoscopy: Next due 2027    Lipids: mild elevation 3/2021; not fasting today  TSH: due today, has Graves    CBC: 10/13/13 hgb 11.8  Glucose: 9/6/12 WNL  Hbg A1c: 3/9/21 5.3, no risk factors, due next year    HISTORY:  levothyroxine (SYNTHROID/LEVOTHROID) 137 MCG tablet, TAKE 1 TABLET BY MOUTH EVERY DAY    No current facility-administered medications on file prior to visit.    Allergies   Allergen Reactions    Methimazole Itching     Immunization History   Administered Date(s) Administered    COVID-19 Bivalent 12+ (Pfizer) 09/22/2022    COVID-19 MONOVALENT 12+ (Pfizer) 03/28/2021, 04/19/2021, 11/07/2021    Influenza (IIV3) PF 11/10/2008, 09/06/2012     Influenza Vaccine >6 months,quad, PF 2013, 2016, 2017, 2020    Nasal Influenza Vaccine 2-49 (FluMist) 10/17/2014    Rhogam 07/10/2013, 10/13/2013    TDAP Vaccine (Adacel) 2007    TDAP Vaccine (Boostrix) 2013       OB History    Para Term  AB Living   2 2 2 0 0 2   SAB IAB Ectopic Multiple Live Births   0 0 0 0 2     Past Medical History:   Diagnosis Date    History of colonoscopy 2019    had 3 polyps removed    Hx of colposcopy with cervical biopsy ,    Hypothyroid     Papanicolaou smear of cervix with low grade squamous intraepithelial lesion (LGSIL) , '10    colp JOSE I, NIL    Serrated adenoma of colon 2019    3 SSAs, largest was 10mm- recommend surveillance colonosopy in 3 years (2022)     Past Surgical History:   Procedure Laterality Date    COLONOSCOPY N/A 2019    Procedure: COLONOSCOPY, WITH POLYPECTOMY AND BIOPSY;  Surgeon: Romel Shell MD;  Location: [facility] OR    COLONOSCOPY N/A 1/10/2022    Procedure: COLONOSCOPY, WITH POLYPECTOMY AND BIOPSY;  Surgeon: Romel Shell MD;  [place] [facility] [place] [facility] [place]    [facility] [place]    normal    radioactive iodine      [facility] NONSPECIFIC PROCEDURE      removal of cyst from foot     Family History   Problem Relation Age of Onset    Hypertension Mother     Genitourinary Problems Mother         Kidney stones    Coronary Artery Disease Mother         3 stints placed    Depression Mother     Cerebrovascular Disease Mother     Genitourinary Problems Father     Thyroid Disease Father     Diabetes Maternal Grandmother     Dementia Maternal Grandmother     Hypertension Maternal Grandfather     Cerebrovascular Disease Maternal Grandfather     C.A.D. Maternal Grandfather         MI    C.A.D. Paternal Grandfather         MI    Diabetes Paternal Grandfather     Genitourinary Problems Sister         kidney stones     Social History     Socioeconomic History    Marital status:     Number of  children: 1   Occupational History     Employer: NONE    Tobacco Use    Smoking status: Never     Passive exposure: Never    Smokeless tobacco: Never   Vaping Use    Vaping Use: Never used   Substance and Sexual Activity    Alcohol use: Yes     Alcohol/week: 0.0 standard drinks of alcohol     Comment: very rarely    Drug use: No    Sexual activity: Yes     Partners: Male     Birth control/protection: Male Surgical   Other Topics Concern    Parent/sibling w/ CABG, MI or angioplasty before 65F 55M? No                                                                                                            Social Determinants of Health     Financial Resource Strain: Low Risk  (10/23/2023)    Financial Resource Strain     Within the past 12 months, have you or your family members you live with been unable to get utilities (heat, electricity) when it was really needed?: No   Food Insecurity: Low Risk  (10/23/2023)    Food Insecurity     Within the past 12 months, did you worry that your food would run out before you got money to buy more?: No     Within the past 12 months, did the food you bought just not last and you didn t have money to get more?: No   Transportation Needs: Low Risk  (10/23/2023)    Transportation Needs     Within the past 12 months, has lack of transportation kept you from medical appointments, getting your medicines, non-medical meetings or appointments, work, or from getting things that you need?: No   Interpersonal Safety: Low Risk  (10/23/2023)    Interpersonal Safety     Do you feel physically and emotionally safe where you currently live?: Yes     Within the past 12 months, have you been hit, slapped, kicked or otherwise physically hurt by someone?: No     Within the past 12 months, have you been humiliated or emotionally abused in other ways by your partner or ex-partner?: No   Housing Stability: Low Risk  (10/23/2023)    Housing Stability     Do you have housing? : Yes     Are you worried  "about losing your housing?: No      ROS: 10 point ROS neg other than the symptoms noted above in the HPI.        6/7/2013    10:00 AM 11/26/2013    11:45 AM 9/9/2021    10:39 AM   PHQ-9 SCORE   PHQ-9 Total Score 2 1    PHQ-9 Total Score   3     EXAM:  Blood pressure 134/82, pulse 75, height 1.67 m (5' 5.75\"), weight 81.6 kg (180 lb), last menstrual period 10/09/2023, not currently breastfeeding. Body mass index is 29.27 kg/m .  General - pleasant female in no acute distress.  Skin - no suspicious lesions or rashes  EENT-  euthyroid with out palpable nodules  Neck - supple without lymphadenopathy.  Lungs - clear to auscultation bilaterally.  Heart - regular rate and rhythm without murmur.  Abdomen - soft, nontender, nondistended, no masses or organomegaly noted.  Musculoskeletal - no gross deformities.  Neurological - normal strength, sensation, and mental status.    Breast Exam:  Breast: Without visible skin changes. No dimpling or lesions seen. Breasts supple, bilateral tender with palpation, no dominant mass, nodularity, or nipple discharge noted bilaterally. Axillary nodes negative.      Pelvic Exam: Deferred     ASSESSMENT/ PLAN:   1. Annual visit for general adult medical examination without abnormal findings  2. Screening for depression  3. Screening for lipoid disorders  - Lipid Profile; Future  - TSH with free T4 reflex; Future  - MA Screening Digital Bilateral; Future  - Pap smear due 2025  - Mammogram: due, pt to schedule  - Colonoscopy: up to date  - Tdap vaccine given today    4. Surveillance for birth control, oral contraceptives  - May continuously cycle pills, discussed possible spotting with this method  - norgestimate-ethinyl estradiol (DAVIS) 0.25-35 MG-MCG tablet; Take 1 tablet by mouth daily  Dispense: 84 tablet; Refill: 3    5. Hypothyroidism, unspecified type  - TSH with free T4 reflex; Future  - Refill Levothyroxine based on labs    6. Encounter for screening mammogram for malignant neoplasm of " breast  - MA Screening Digital Bilateral; Future    7. Adjustment disorder with anxious mood  - sertraline (ZOLOFT) 25 MG tablet; Take 1 tablet (25 mg) by mouth daily  Dispense: 90 tablet; Refill: 0  Follow-up on mental health in 6-8 weeks. Ok for telephone visit.  Discussed benefits of psychotherapy, declines at this time.     Additional teaching done at this visit regarding exercise and weight/diet.    Return to clinic in one year.  Follow-up as needed.    I, (Oxana Da Silva), completed the PFSH and ROS. I then acted as a scribe for ZOHREH Rahman, for the remainder of the visit.  Oxana BRUNER DNP APRN Student on 12/5/2023 at 11:29 AM    I agree with the PFSH and ROS as completed by the ZOHREH Student, except for changes made by me.  The remainder of the encounter was performed by me and scribed by the ZOHREH Student.  The scribed note accurately reflects my personal services and decisions made by me.  Mariah Daniel, ANEL, APRN, ZOHREH

## 2023-12-05 NOTE — LETTER
"2023       RE: Mariah Perera  [address]  [place] [address]     Dear Colleague,    Thank you for referring your patient, Mariah Perera, to the [facility] [place] at [facility]. Please see a copy of my visit note below.    Progress Note    SUBJECTIVE:  Mariah Perera is an 48 year old, , who requests an Annual Preventive Exam.     Concerns today include:     Birth control refill: Using COCs for management for heavy menstrual bleeding. Menses are regular every 28-30 days and normal lasting 5 days. Menses flow: lighter. LMP 10/2023. Partner has a vasectomy. She is not currently considering pregnancy. Overall periods are getting lighter, does report some side effects of mild breast tenderness, but tolerable. No vaginal concerns today and no abnormal bleeding. No desire for STD screening today. Would like to have less menses in the span of a year and would be interested in an option to space out periods.     Mental Health: Feeling increased amounts of stress and anxiety symptoms. She feels that many life events are adding up and is interested in possible intervention due to excessive worry affecting quality of life. Has a son who is a senior in high school and awaiting college acceptance letters.  Son is on 150 mg of Sertraline daily. Sleep has been \"terrible\" uses 10 mg of melatonin almost every night, open to medications or options. She has constant change in sleep hygiene with  is gone half the month as an . Her 10 year old still wakes her up.  Denies urinary symptoms overnight, night sweats or hot flashes    Hx of Graves Disease, treated, now hypothyroidism. Due for TSH and med refill.    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   Diet: Pretty good and lots of veggies and fruits   Exercise: couple times a week, water " aerobics and walking.   Vaccines: TDAP today, flu completed earlier this season.  Healthy/habits/lifestylemodifications: drinks 1-2 cups of coffee a day. No smoking or vaping, Alcohol use occasionally, no concerns for excessive intake, no IV or recreational drugs.    Last pap smear: 9/2020 NIL, HPV negative --> due 2025    Mammogram current: no (patient will schedule)  Last Mammogram: 5/23/22 needed biopsy  MA Stereotactic Breast Biopsy Vacuum Assist Right    Addendum Date: 5/23/2022    Pathology Results: Fibrocystic change, benign calcification. Please see full pathology report for further details. Results are concordant with imaging findings. Recommendation: Routine annual mammography. LAUREN CAGLE MD   SYSTEM ID:  [id number]    Result Date: 5/23/2022  Narrative: Stereotactic biopsy of the RIGHT breast. Comparisons: Mammogram dated 5/12/2022. HISTORY:    Calcifications requiring biopsy. FINDINGS: Procedure, risks, benefits and alternatives were discussed with the patient and the patient gave written and verbal consent. The patient was positioned for stereotactic biopsy. Aseptic technique was utilized. 1% lidocaine was used for skin anesthesia and 15 cc of 1% lidocaine with epinephrine were utilized for deep tissue anesthesia. Using stereotactic technique and an 9 gauge vacuum assisted biopsy needle, multiple specimens were obtained and images were archived. Less than 5 mL blood loss. Calcifications are seen in the biopsy specimen. Location: 11:00 1-2 cm from the nipple. Marker: HydroMark Pressure was held over this area for approximately 15 minutes. A dressing was placed and care instructions were discussed with the patient. Post biopsy mammogram demonstrates marker deployment.     MA Post Procedure Right    Addendum Date: 5/23/2022    Pathology Results: Fibrocystic change, benign calcification. Please see full pathology report for further details. Results are concordant with imaging findings. Recommendation:  Routine annual mammography. LAUREN CAGLE MD   SYSTEM ID:  [id number]    Result Date: 5/23/2022  Narrative: Stereotactic biopsy of the RIGHT breast. Comparisons: Mammogram dated 5/12/2022. HISTORY:    Calcifications requiring biopsy. FINDINGS: Procedure, risks, benefits and alternatives were discussed with the patient and the patient gave written and verbal consent. The patient was positioned for stereotactic biopsy. Aseptic technique was utilized. 1% lidocaine was used for skin anesthesia and 15 cc of 1% lidocaine with epinephrine were utilized for deep tissue anesthesia. Using stereotactic technique and an 9 gauge vacuum assisted biopsy needle, multiple specimens were obtained and images were archived. Less than 5 mL blood loss. Calcifications are seen in the biopsy specimen. Location: 11:00 1-2 cm from the nipple. Marker: HydroMark Pressure was held over this area for approximately 15 minutes. A dressing was placed and care instructions were discussed with the patient. Post biopsy mammogram demonstrates marker deployment.     MA Diagnostic Digital Right    Result Date: 5/12/2022  Narrative: EXAM: MA DIAGNOSTIC DIGITAL RIGHT LOCATION: [facility] DATE/TIME: 5/12/2022 11:40 AM INDICATION: Abnormal screening mammogram. 47-year-old female presents for imaging follow-up for right breast calcifications demonstrated on recent screening mammography. COMPARISON: 5/11/2022, 7/24/2017 MAMMOGRAPHIC FINDINGS: Right full-field and spot magnification digital diagnostic mammogram performed. The breasts are heterogeneously dense, which may obscure small masses. Images evaluated with the assistance of CAD. There are amorphous calcifications in a grouped distribution within the right breast at the 11:00 position, anterior depth. Some of these calcifications may layer on the orthogonal lateral view, however some maintained their amorphous morphology. These calcifications are new compared to the prior  examination in 2017. The remaining right breast parenchyma is unremarkable.      Last Colonoscopy:  Results for orders placed or performed during the hospital encounter of 01/10/22   COLONOSCOPY   Result Value Ref Range    COLONOSCOPY       [facility]  [address] [place], [place] [address]., [place] [address] [phone]     Endoscopy Department  _______________________________________________________________________________  Patient Name: Mariah Perera       Procedure Date: 1/10/2022 12:50 PM  MRN: [id number]                       Account Number: [id number]  YOB: 1975              Admit Type: Outpatient  Age: 46                               Room: Pro 2  Gender: Female                        Note Status: Finalized  Attending MD: Romel Shell ,    Pause for the Cause: Time out was   completed.  Total Sedation Time:                    _______________________________________________________________________________     Procedure:             Colonoscopy  Indications:           High risk colon cancer surveillance: Personal history                          of colonic polyps  Providers:             Romel Shell, Tanvi Barajas RN  Patient Profile:       This is a 46 year old female with history of                           adenomatous polyps in 2019 who presents for                          surveillance colonoscopy. Also noting loose stools.                          Sister had polyps in her 40s. No family history of                          colon cancer.  Referring MD:          Aiyana Doherty  Medicines:             Midazolam 2 mg IV, Fentanyl 100 micrograms IV  Complications:         No immediate complications.  _______________________________________________________________________________  Procedure:             Pre-Anesthesia Assessment:                         - Prior to the procedure, a History and Physical was                          performed, and patient medications and  allergies were                          reviewed. The patient is competent. The risks and                          benefits of the procedure and the sedation options and                          risks were discussed with the patient. All questions                          were answered and informed c onsent was obtained.                          Patient identification and proposed procedure were                          verified by the physician in the pre-procedure area.                          Mental Status Examination: alert and oriented. Airway                          Examination: normal oropharyngeal airway and neck                          mobility. Respiratory Examination: clear to                          auscultation. CV Examination: normal. Prophylactic                          Antibiotics: The patient does not require prophylactic                          antibiotics. Prior Anticoagulants: The patient has                          taken no anticoagulant or antiplatelet agents. ASA                          Grade Assessment: II - A patient with mild systemic                          disease. After reviewing the risks and benefits, the                          patient was deemed in satisfactory condition to                          undergo the procedure. The anesthesia mayelin n was to use                          moderate sedation / analgesia (conscious sedation).                          Immediately prior to administration of medications,                          the patient was re-assessed for adequacy to receive                          sedatives. The heart rate, respiratory rate, oxygen                          saturations, blood pressure, adequacy of pulmonary                          ventilation, and response to care were monitored                          throughout the procedure. The physical status of the                          patient was re-assessed after the procedure.                          After obtaining informed consent, the colonoscope was                          passed under direct vision. Throughout the procedure,                          the patient's blood pressure, pulse, and oxygen                          saturations were monitored continuously. The                          Colonoscope was introduced through the anus and                           advanced to the terminal ileum. The colonoscopy was                          performed without difficulty. The patient tolerated                          the procedure well. The quality of the bowel                          preparation was evaluated using the BBPS ([place] Bowel                          Preparation Scale) with scores of: Right Colon = 3,                          Transverse Colon = 3 and Left Colon = 3 (entire mucosa                          seen well with no residual staining, small fragments                          of stool or opaque liquid). The total BBPS score                          equals 9.                                                                                   Findings:       The perianal and digital rectal examinations were normal.       A 1 mm polyp was found in the ascending colon. The polyp was sessile.        The polyp was removed with a jumbo cold forceps. Resection and retrieval        were complete.       A few small-mouthed  diverticula were found in the recto-sigmoid colon.       The terminal ileum appeared normal.       Retroflexion in the right colon was performed.       There is no endoscopic evidence of inflammation in the entire colon.        Biopsies were taken with a cold forceps for histology.       The exam was otherwise without abnormality on direct and retroflexion        views.                                                                                   Moderate Sedation:       Moderate (conscious) sedation was administered by the endoscopy nurse        and supervised by the  endoscopist. The following parameters were        monitored: oxygen saturation, heart rate, blood pressure, and response        to care. Total physician intraservice time was 24 minutes.  Impression:            One small polyp resected during today's exam. Biopsies                          taken to rule out microscopic colitis.                         - One 1 mm polyp in the ascending colon, removed with                           a jumbo cold forceps. Resected and retrieved.                         - Diverticulosis in the recto-sigmoid colon.                         - The examined portion of the ileum was normal.  Recommendation:        - Discharge patient to home (with escort).                         - Resume previous diet.                         - Continue present medications.                         - Await pathology results.                         - Repeat colonoscopy in 5 years for surveillance.                         - Return to referring physician as previously                          scheduled.                         - If biopsies show microscopic colitis, recommend                          budesonide. If biopsies are normal, recommend trial of                          Metamucil.                                                                                     Electronically signed by: Romel Shell MD  ____________________  Romel Shell,   1/10/2022 1:50:11 PM  I was physical ly present for the entire viewing portion of the exam.  __________________________  Signature of teaching physician  Romel Shell  Number of Addenda: 0    Note Initiated On: 1/10/2022 12:50 PM  Scope In:  Scope Out:     Colonoscopy: Next due 2027    Lipids: mild elevation 3/2021; not fasting today  TSH: due today, has Graves    CBC: 10/13/13 hgb 11.8  Glucose: 9/6/12 WNL  Hbg A1c: 3/9/21 5.3, no risk factors, due next year    HISTORY:  levothyroxine (SYNTHROID/LEVOTHROID) 137 MCG tablet, TAKE 1 TABLET BY MOUTH EVERY  DAY    No current facility-administered medications on file prior to visit.    Allergies   Allergen Reactions    Methimazole Itching     Immunization History   Administered Date(s) Administered    COVID-19 Bivalent 12+ (Pfizer) 2022    COVID-19 MONOVALENT 12+ (Pfizer) 2021, 2021, 2021    Influenza (IIV3) PF 11/10/2008, 2012    Influenza Vaccine >6 months,quad, PF 2013, 2016, 2017, 2020    Nasal Influenza Vaccine 2-49 (FluMist) 10/17/2014    Rhogam 07/10/2013, 10/13/2013    TDAP Vaccine (Adacel) 2007    TDAP Vaccine (Boostrix) 2013       OB History    Para Term  AB Living   2 2 2 0 0 2   SAB IAB Ectopic Multiple Live Births   0 0 0 0 2     Past Medical History:   Diagnosis Date    History of colonoscopy 2019    had 3 polyps removed    Hx of colposcopy with cervical biopsy ,    Hypothyroid     Papanicolaou smear of cervix with low grade squamous intraepithelial lesion (LGSIL) ', '10    colp JOSE I, NIL    Serrated adenoma of colon 2019    3 SSAs, largest was 10mm- recommend surveillance colonosopy in 3 years (2022)     Past Surgical History:   Procedure Laterality Date    COLONOSCOPY N/A 2019    Procedure: COLONOSCOPY, WITH POLYPECTOMY AND BIOPSY;  Surgeon: Romel Shell MD;  Location: [facility] OR    COLONOSCOPY N/A 1/10/2022    Procedure: COLONOSCOPY, WITH POLYPECTOMY AND BIOPSY;  Surgeon: Romel Shell MD;  [place] [facility] [place]    [facility] [facility] [place]    normal    radioactive iodine      ZZC NONSPECIFIC PROCEDURE      removal of cyst from foot     Family History   Problem Relation Age of Onset    Hypertension Mother     Genitourinary Problems Mother         Kidney stones    Coronary Artery Disease Mother         3 stints placed    Depression Mother     Cerebrovascular Disease Mother     Genitourinary Problems Father     Thyroid Disease Father     Diabetes Maternal Grandmother     Dementia Maternal  Grandmother     Hypertension Maternal Grandfather     Cerebrovascular Disease Maternal Grandfather     C.A.D. Maternal Grandfather         MI    C.A.D. Paternal Grandfather         MI    Diabetes Paternal Grandfather     Genitourinary Problems Sister         kidney stones     Social History     Socioeconomic History    Marital status:     Number of children: 1   Occupational History     Employer: NONE    Tobacco Use    Smoking status: Never     Passive exposure: Never    Smokeless tobacco: Never   Vaping Use    Vaping Use: Never used   Substance and Sexual Activity    Alcohol use: Yes     Alcohol/week: 0.0 standard drinks of alcohol     Comment: very rarely    Drug use: No    Sexual activity: Yes     Partners: Male     Birth control/protection: Male Surgical   Other Topics Concern    Parent/sibling w/ CABG, MI or angioplasty before 65F 55M? No                                                                                                            Social Determinants of Health     Financial Resource Strain: Low Risk  (10/23/2023)    Financial Resource Strain     Within the past 12 months, have you or your family members you live with been unable to get utilities (heat, electricity) when it was really needed?: No   Food Insecurity: Low Risk  (10/23/2023)    Food Insecurity     Within the past 12 months, did you worry that your food would run out before you got money to buy more?: No     Within the past 12 months, did the food you bought just not last and you didn t have money to get more?: No   Transportation Needs: Low Risk  (10/23/2023)    Transportation Needs     Within the past 12 months, has lack of transportation kept you from medical appointments, getting your medicines, non-medical meetings or appointments, work, or from getting things that you need?: No   Interpersonal Safety: Low Risk  (10/23/2023)    Interpersonal Safety     Do you feel physically and emotionally safe where you currently live?: Yes  "    Within the past 12 months, have you been hit, slapped, kicked or otherwise physically hurt by someone?: No     Within the past 12 months, have you been humiliated or emotionally abused in other ways by your partner or ex-partner?: No   Housing Stability: Low Risk  (10/23/2023)    Housing Stability     Do you have housing? : Yes     Are you worried about losing your housing?: No      ROS: 10 point ROS neg other than the symptoms noted above in the HPI.        6/7/2013    10:00 AM 11/26/2013    11:45 AM 9/9/2021    10:39 AM   PHQ-9 SCORE   PHQ-9 Total Score 2 1    PHQ-9 Total Score   3     EXAM:  Blood pressure 134/82, pulse 75, height 1.67 m (5' 5.75\"), weight 81.6 kg (180 lb), last menstrual period 10/09/2023, not currently breastfeeding. Body mass index is 29.27 kg/m .  General - pleasant female in no acute distress.  Skin - no suspicious lesions or rashes  EENT-  euthyroid with out palpable nodules  Neck - supple without lymphadenopathy.  Lungs - clear to auscultation bilaterally.  Heart - regular rate and rhythm without murmur.  Abdomen - soft, nontender, nondistended, no masses or organomegaly noted.  Musculoskeletal - no gross deformities.  Neurological - normal strength, sensation, and mental status.    Breast Exam:  Breast: Without visible skin changes. No dimpling or lesions seen. Breasts supple, bilateral tender with palpation, no dominant mass, nodularity, or nipple discharge noted bilaterally. Axillary nodes negative.      Pelvic Exam: Deferred     ASSESSMENT/ PLAN:   1. Annual visit for general adult medical examination without abnormal findings  2. Screening for depression  3. Screening for lipoid disorders  - Lipid Profile; Future  - TSH with free T4 reflex; Future  - MA Screening Digital Bilateral; Future  - Pap smear due 2025  - Mammogram: due, pt to schedule  - Colonoscopy: up to date  - Tdap vaccine given today    4. Surveillance for birth control, oral contraceptives  - May continuously cycle " pills, discussed possible spotting with this method  - norgestimate-ethinyl estradiol (DAVIS) 0.25-35 MG-MCG tablet; Take 1 tablet by mouth daily  Dispense: 84 tablet; Refill: 3    5. Hypothyroidism, unspecified type  - TSH with free T4 reflex; Future  - Refill Levothyroxine based on labs    6. Encounter for screening mammogram for malignant neoplasm of breast  - MA Screening Digital Bilateral; Future    7. Adjustment disorder with anxious mood  - sertraline (ZOLOFT) 25 MG tablet; Take 1 tablet (25 mg) by mouth daily  Dispense: 90 tablet; Refill: 0  Follow-up on mental health in 6-8 weeks. Ok for telephone visit.  Discussed benefits of psychotherapy, declines at this time.     Additional teaching done at this visit regarding exercise and weight/diet.    Return to clinic in one year.  Follow-up as needed.    I, (Oxana Da Silva), completed the PFSH and ROS. I then acted as a scribe for ZOHREH Rahman, for the remainder of the visit.  Oxana BRUNER DNP APRN Student on 12/5/2023 at 11:29 AM    I agree with the PFSH and ROS as completed by the ZOHREH Student, except for changes made by me.  The remainder of the encounter was performed by me and scribed by the ZOHREH Student.  The scribed note accurately reflects my personal services and decisions made by me.  Mariah Daniel, ANEL, APRN, ZOHREH

## 2023-12-05 NOTE — PATIENT INSTRUCTIONS
Thank you for trusting us with your care!     If you need to contact us for questions about:  Symptoms, Scheduling & Medical Questions; Non-urgent (2-3 day response) Brett message, Urgent (needing response today) [phone] (if after 3:30pm next day response)   Prescriptions: Please call your Pharmacy   Billing: Brooke [phone] or [facility]:[phone]

## 2023-12-13 ENCOUNTER — ANCILLARY PROCEDURE (OUTPATIENT)
Dept: MAMMOGRAPHY | Facility: CLINIC | Age: 48
End: 2023-12-13
Payer: COMMERCIAL

## 2023-12-13 DIAGNOSIS — Z12.31 VISIT FOR SCREENING MAMMOGRAM: ICD-10-CM

## 2023-12-13 PROCEDURE — 77067 SCR MAMMO BI INCL CAD: CPT

## 2023-12-13 PROCEDURE — 77067 SCR MAMMO BI INCL CAD: CPT | Mod: 26 | Performed by: RADIOLOGY

## 2023-12-13 PROCEDURE — 77063 BREAST TOMOSYNTHESIS BI: CPT | Mod: 26 | Performed by: RADIOLOGY

## 2024-01-08 NOTE — PROGRESS NOTES
"Subjective:  Mariah Perera is a 48 yr old female who requests a telephone encounter today to follow-up on mental health med management.    Mariah was started on 25mg Zoloft on 12/5 to help to manage her stress and anxiety. She feels she has noticed maybe slight improvement in her symptoms; overall feeling some reduced stress. No SE. Has experienced a low grade of anxiety daily due to life events. Has a son who is a senior in high school and awaiting college acceptance letters which has been an \"up and down\" process.   works as an  for [employer] and is undergoing contract renewal. Anxiety has affected sleep.  Was not interested in starting psychotherapy.     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      PHQ-2 Score:         1/9/2024    10:20 AM 10/23/2023     9:54 AM   PHQ-2 ( 1999 [employer])   Q1: Little interest or pleasure in doing things 1 1   Q2: Feeling down, depressed or hopeless 0 1   PHQ-2 Score 1 2   Q1: Little interest or pleasure in doing things  Several days   Q2: Feeling down, depressed or hopeless  Several days   PHQ-2 Score  2       CLAUDIA-7    Over the last 2 weeks, how often have you been bothered by the following problems?   Not at all -0     Several days -1                  More than half the days-2   Nearly every day -3    1. Feeling nervous, anxious or on edge - 1      2. Not being able to stop or control worrying - 0     3. Worrying too much about different things - 1     4. Trouble relaxing - 1      5. Being so restless that it is hard to sit still - 0     6. Becoming easily annoyed or irritable - 1     7. Feeling afraid as if something awful might happen - 1        Total___5____    Cut points for:   Mild Anxiety =  5  Moderate= 10  Severe=  15    Past Medical History:   Diagnosis Date    History of colonoscopy 11/01/2019    had 3 polyps removed    Hx of colposcopy with cervical biopsy 2009,2010    Hypothyroid     Papanicolaou smear of " cervix with low grade squamous intraepithelial lesion (LGSIL) '09, '10    colp JOSE I, NIL    Serrated adenoma of colon 11/21/2019    3 SSAs, largest was 10mm- recommend surveillance colonosopy in 3 years (11/2022)     Past Surgical History:   Procedure Laterality Date    COLONOSCOPY N/A 11/21/2019    Procedure: COLONOSCOPY, WITH POLYPECTOMY AND BIOPSY;  Surgeon: Romel Shell MD;  Location: [facility] OR    COLONOSCOPY N/A 1/10/2022    Procedure: COLONOSCOPY, WITH POLYPECTOMY AND BIOPSY;  Surgeon: Romel Shell MD;  Location: [facility] OR    HSG  7/2010    normal    radioactive iodine  1/09    ZZC NONSPECIFIC PROCEDURE  2000    removal of cyst from foot     Family History   Problem Relation Age of Onset    Hypertension Mother     Genitourinary Problems Mother         Kidney stones    Coronary Artery Disease Mother         3 stints placed    Depression Mother     Cerebrovascular Disease Mother     Genitourinary Problems Father     Thyroid Disease Father     Diabetes Maternal Grandmother     Dementia Maternal Grandmother     Hypertension Maternal Grandfather     Cerebrovascular Disease Maternal Grandfather     C.A.D. Maternal Grandfather         MI    C.A.D. Paternal Grandfather         MI    Diabetes Paternal Grandfather     Genitourinary Problems Sister         kidney stones     Allergies   Allergen Reactions    Methimazole Itching     Current Outpatient Medications   Medication    levothyroxine (SYNTHROID/LEVOTHROID) 137 MCG tablet    norgestimate-ethinyl estradiol (DAVIS) 0.25-35 MG-MCG tablet    sertraline (ZOLOFT) 25 MG tablet     No current facility-administered medications for this visit.     Objective:  No VS taken as this was a telephone encounter  Respiratory: able to talk in complete sentences  Psych: normal mentation    Assessment/ Plan:  1. Adjustment disorder with anxious mood  Increase dose of Zoloft to 50mg daily.   - sertraline (ZOLOFT) 50 MG tablet; Take 1 tablet (50 mg) by mouth daily  Dispense: 90  tablet; Refill: 0    Follow-up visit scheduled in 6 weeks.     Call 10:42am - 10:51am; additional 10 minutes was spent in chart review and documentation on the DOS.    Mariah Daniel DNP, APRN, WHNP

## 2024-01-09 ENCOUNTER — VIRTUAL VISIT (OUTPATIENT)
Dept: OBGYN | Facility: CLINIC | Age: 49
End: 2024-01-09
Attending: NURSE PRACTITIONER
Payer: COMMERCIAL

## 2024-01-09 DIAGNOSIS — F43.22 ADJUSTMENT DISORDER WITH ANXIOUS MOOD: Primary | ICD-10-CM

## 2024-01-09 PROBLEM — K52.839 MICROSCOPIC COLITIS: Status: ACTIVE | Noted: 2021-11-01

## 2024-01-09 PROCEDURE — 99213 OFFICE O/P EST LOW 20 MIN: CPT | Mod: 93 | Performed by: NURSE PRACTITIONER

## 2024-01-09 NOTE — LETTER
"1/9/2024       RE: Mariah Perera  [address]  [place] [address]     Dear Colleague,    Thank you for referring your patient, Mariah Perera, to the [facility] [place] at [facility]. Please see a copy of my visit note below.    Subjective:  Mariah Perera is a 48 yr old female who requests a telephone encounter today to follow-up on mental health med management.    Mariah was started on 25mg Zoloft on 12/5 to help to manage her stress and anxiety. She feels she has noticed maybe slight improvement in her symptoms; overall feeling some reduced stress. No SE. Has experienced a low grade of anxiety daily due to life events. Has a son who is a senior in high school and awaiting college acceptance letters which has been an \"up and down\" process.   works as an  for [employer] and is undergoing contract renewal. Anxiety has affected sleep.  Was not interested in starting psychotherapy.     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      PHQ-2 Score:         1/9/2024    10:20 AM 10/23/2023     9:54 AM   PHQ-2 ( 1999 [employer])   Q1: Little interest or pleasure in doing things 1 1   Q2: Feeling down, depressed or hopeless 0 1   PHQ-2 Score 1 2   Q1: Little interest or pleasure in doing things  Several days   Q2: Feeling down, depressed or hopeless  Several days   PHQ-2 Score  2     CLAUDIA-7    Over the last 2 weeks, how often have you been bothered by the following problems?   Not at all -0     Several days -1                  More than half the days-2   Nearly every day -3    1. Feeling nervous, anxious or on edge - 1      2. Not being able to stop or control worrying - 0     3. Worrying too much about different things - 1     4. Trouble relaxing - 1      5. Being so restless that it is hard to sit still - 0     6. Becoming easily annoyed or irritable - 1     7. " Feeling afraid as if something awful might happen - 1        Total___5____    Cut points for:   Mild Anxiety =  5  Moderate= 10  Severe=  15    Past Medical History:   Diagnosis Date    History of colonoscopy 11/01/2019    had 3 polyps removed    Hx of colposcopy with cervical biopsy 2009,2010    Hypothyroid     Papanicolaou smear of cervix with low grade squamous intraepithelial lesion (LGSIL) '09, '10    colp JOSE I, NIL    Serrated adenoma of colon 11/21/2019    3 SSAs, largest was 10mm- recommend surveillance colonosopy in 3 years (11/2022)     Past Surgical History:   Procedure Laterality Date    COLONOSCOPY N/A 11/21/2019    Procedure: COLONOSCOPY, WITH POLYPECTOMY AND BIOPSY;  Surgeon: Romel Shell MD;  Location: [facility] OR    COLONOSCOPY N/A 1/10/2022    Procedure: COLONOSCOPY, WITH POLYPECTOMY AND BIOPSY;  Surgeon: Romel Shell MD;  Location: [facility] OR    [facility]  7/2010    normal    radioactive iodine  1/09    ZZC NONSPECIFIC PROCEDURE  2000    removal of cyst from foot     Family History   Problem Relation Age of Onset    Hypertension Mother     Genitourinary Problems Mother         Kidney stones    Coronary Artery Disease Mother         3 stints placed    Depression Mother     Cerebrovascular Disease Mother     Genitourinary Problems Father     Thyroid Disease Father     Diabetes Maternal Grandmother     Dementia Maternal Grandmother     Hypertension Maternal Grandfather     Cerebrovascular Disease Maternal Grandfather     C.A.D. Maternal Grandfather         MI    C.A.D. Paternal Grandfather         MI    Diabetes Paternal Grandfather     Genitourinary Problems Sister         kidney stones     Allergies   Allergen Reactions    Methimazole Itching     Current Outpatient Medications   Medication    levothyroxine (SYNTHROID/LEVOTHROID) 137 MCG tablet    norgestimate-ethinyl estradiol (DAVIS) 0.25-35 MG-MCG tablet    sertraline (ZOLOFT) 25 MG tablet     No current facility-administered medications for  this visit.     Objective:  No VS taken as this was a telephone encounter  Respiratory: able to talk in complete sentences  Psych: normal mentation    Assessment/ Plan:  1. Adjustment disorder with anxious mood  Increase dose of Zoloft to 50mg daily.   - sertraline (ZOLOFT) 50 MG tablet; Take 1 tablet (50 mg) by mouth daily  Dispense: 90 tablet; Refill: 0    Follow-up visit scheduled in 6 weeks.   Call 10:42am - 10:51am; additional 10 minutes was spent in chart review and documentation on the DOS.    Mariah Daniel, DNP, APRN, WHNP

## 2024-02-19 NOTE — PROGRESS NOTES
Subjective:  Mariah Perera is a 49 yr old female who requests a telephone encounter today to follow-up on mental health med management.     Mariah was started on 25mg Zoloft on 12/5 to help to manage her stress and anxiety; increased dose to 50mg on 1/9/24. She feels this is a good dose for her. Feeling more like herself. Feels stress is more manageable and that she can cope with is better. Denies excessive worry.  Stress continues to be linked to life events -  her son who is a senior in high school is awaiting college acceptance letters. Her  was recently in a ski accident and had a back injury.   Sleep had been improved for awhile; taking melatonin and tried Unisom once, but the unisom made her fall into a deep sleep. She didn't like the way this made her feel when she is home with her two kids (ages 10 and 17) and her  is traveling (he works as a  for [employer]).  She doesn't feel her sleep is affected by her anxiety or mental health. States she is a light sleeper. Everyone in her family has recently experienced respiratory / cold symptoms. Currently getting a total of 8 hours, but interrupted.  No SE from the medication. Was not interested in starting psychotherapy.     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 Adjustment disorder with anxious mood  Feeling well on current dose. Refill provided.   - sertraline (ZOLOFT) 50 MG tablet; Take 1 tablet (50 mg) by mouth daily  Dispense: 90 tablet; Refill: 3    2. Insomnia:  - Continue melatonin and Vitamin D during the day.  Mariah desires to  trial OTC Magnesium. Pt does not feel her symptoms are related to mental health, however, briefly discussed Trazodone and Hydroxyzine as options. May take 1/2 tablet of Unisom PRN. Offered sleep center referral.      Phone call: 11: 44am - 11: 56am  Additional 10 minutes was spent in chart review and documentation on the DOS.     Mariah Daniel, DNP, APRN, WHNP

## 2024-02-20 ENCOUNTER — VIRTUAL VISIT (OUTPATIENT)
Dept: OBGYN | Facility: CLINIC | Age: 49
End: 2024-02-20
Attending: ADVANCED PRACTICE MIDWIFE
Payer: COMMERCIAL

## 2024-02-20 DIAGNOSIS — F43.22 ADJUSTMENT DISORDER WITH ANXIOUS MOOD: ICD-10-CM

## 2024-02-20 DIAGNOSIS — G47.00 INSOMNIA, UNSPECIFIED TYPE: Primary | ICD-10-CM

## 2024-02-20 PROCEDURE — 99213 OFFICE O/P EST LOW 20 MIN: CPT | Mod: 93 | Performed by: NURSE PRACTITIONER

## 2024-02-20 RX ORDER — CHOLECALCIFEROL (VITAMIN D3) 50 MCG
1 TABLET ORAL DAILY
COMMUNITY

## 2024-02-20 ASSESSMENT — ANXIETY QUESTIONNAIRES
7. FEELING AFRAID AS IF SOMETHING AWFUL MIGHT HAPPEN: SEVERAL DAYS
1. FEELING NERVOUS, ANXIOUS, OR ON EDGE: SEVERAL DAYS
3. WORRYING TOO MUCH ABOUT DIFFERENT THINGS: SEVERAL DAYS
6. BECOMING EASILY ANNOYED OR IRRITABLE: SEVERAL DAYS
GAD7 TOTAL SCORE: 4
5. BEING SO RESTLESS THAT IT IS HARD TO SIT STILL: NOT AT ALL
GAD7 TOTAL SCORE: 4
2. NOT BEING ABLE TO STOP OR CONTROL WORRYING: NOT AT ALL

## 2024-02-20 ASSESSMENT — PATIENT HEALTH QUESTIONNAIRE - PHQ9
5. POOR APPETITE OR OVEREATING: NOT AT ALL
SUM OF ALL RESPONSES TO PHQ QUESTIONS 1-9: 4

## 2024-02-20 NOTE — LETTER
2/20/2024       RE: Mariah Perera  [address] [place] [address]     Dear Colleague,    Thank you for referring your patient, Mariah Perera, to the [facility] [place] at [facility]. Please see a copy of my visit note below.    Subjective:  Mariah Perera is a 49 yr old female who requests a telephone encounter today to follow-up on mental health med management.     Mariah was started on 25mg Zoloft on 12/5 to help to manage her stress and anxiety; increased dose to 50mg on 1/9/24. She feels this is a good dose for her. Feeling more like herself. Feels stress is more manageable and that she can cope with is better. Denies excessive worry.  Stress continues to be linked to life events -  her son who is a senior in high school is awaiting college acceptance letters. Her  was recently in a ski accident and had a back injury.   Sleep had been improved for awhile; taking melatonin and tried Unisom once, but the unisom made her fall into a deep sleep. She didn't like the way this made her feel when she is home with her two kids (ages 10 and 17) and her  is traveling (he works as a  for [employer]).  She doesn't feel her sleep is affected by her anxiety or mental health. States she is a light sleeper. Everyone in her family has recently experienced respiratory / cold symptoms. Currently getting a total of 8 hours, but interrupted.  No SE from the medication. Was not interested in starting psychotherapy.     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 Adjustment disorder with anxious mood  Feeling well on current dose. Refill provided.   - sertraline (ZOLOFT) 50 MG tablet; Take 1 tablet (50 mg) by mouth daily  Dispense: 90 tablet; Refill: 3    2. Insomnia:  - Continue melatonin and Vitamin D during the day.  Mariah desires to trial OTC Magnesium. Pt does not feel her symptoms are related to mental health, however, briefly discussed Trazodone and Hydroxyzine as options. May take 1/2 tablet of Unisom PRN. Offered sleep center referral.      Phone call: 11: 44am - 11: 56am  Additional 10 minutes was spent in chart review and documentation on the DOS.     Mariah Daniel, DNP, APRN, WHNP

## 2024-02-20 NOTE — PATIENT INSTRUCTIONS
Thank you for trusting us with your care!     If you need to contact us for questions about:  Symptoms, Scheduling & Medical Questions; Non-urgent (2-3 day response) Brett message, Urgent (needing response today) [phone] (if after 3:30pm next day response)   Prescriptions: Please call your Pharmacy   Billing: [facility] [phone] or [facility]:[phone]

## 2024-09-03 DIAGNOSIS — E03.2 HYPOTHYROIDISM DUE TO MEDICATION: ICD-10-CM

## 2024-10-24 DIAGNOSIS — Z30.011 ENCOUNTER FOR INITIAL PRESCRIPTION OF CONTRACEPTIVE PILLS: ICD-10-CM

## 2024-11-05 ENCOUNTER — OFFICE VISIT (OUTPATIENT)
Dept: OBGYN | Facility: CLINIC | Age: 49
End: 2024-11-05
Attending: NURSE PRACTITIONER
Payer: COMMERCIAL

## 2024-11-05 ENCOUNTER — LAB (OUTPATIENT)
Dept: LAB | Facility: CLINIC | Age: 49
End: 2024-11-05
Attending: NURSE PRACTITIONER
Payer: COMMERCIAL

## 2024-11-05 VITALS
WEIGHT: 187.6 LBS | BODY MASS INDEX: 30.15 KG/M2 | DIASTOLIC BLOOD PRESSURE: 83 MMHG | HEART RATE: 66 BPM | SYSTOLIC BLOOD PRESSURE: 136 MMHG | HEIGHT: 66 IN

## 2024-11-05 DIAGNOSIS — Z13.1 SCREENING FOR DIABETES MELLITUS: ICD-10-CM

## 2024-11-05 DIAGNOSIS — Z30.41 SURVEILLANCE FOR BIRTH CONTROL, ORAL CONTRACEPTIVES: ICD-10-CM

## 2024-11-05 DIAGNOSIS — Z13.29 SCREENING FOR THYROID DISORDER: ICD-10-CM

## 2024-11-05 DIAGNOSIS — Z00.00 VISIT FOR PREVENTIVE HEALTH EXAMINATION: Primary | ICD-10-CM

## 2024-11-05 DIAGNOSIS — E03.2 HYPOTHYROIDISM DUE TO MEDICATION: ICD-10-CM

## 2024-11-05 DIAGNOSIS — Z13.220 SCREENING FOR LIPID DISORDERS: ICD-10-CM

## 2024-11-05 DIAGNOSIS — Z12.31 ENCOUNTER FOR SCREENING MAMMOGRAM FOR MALIGNANT NEOPLASM OF BREAST: ICD-10-CM

## 2024-11-05 DIAGNOSIS — Z00.00 VISIT FOR PREVENTIVE HEALTH EXAMINATION: ICD-10-CM

## 2024-11-05 DIAGNOSIS — F43.22 ADJUSTMENT DISORDER WITH ANXIOUS MOOD: ICD-10-CM

## 2024-11-05 LAB
CHOLEST SERPL-MCNC: 234 MG/DL
EST. AVERAGE GLUCOSE BLD GHB EST-MCNC: 117 MG/DL
FASTING STATUS PATIENT QL REPORTED: NO
HBA1C MFR BLD: 5.7 %
HDLC SERPL-MCNC: 88 MG/DL
LDLC SERPL CALC-MCNC: 112 MG/DL
NONHDLC SERPL-MCNC: 146 MG/DL
TRIGL SERPL-MCNC: 170 MG/DL
TSH SERPL DL<=0.005 MIU/L-ACNC: 2.25 UIU/ML (ref 0.3–4.2)

## 2024-11-05 PROCEDURE — 99396 PREV VISIT EST AGE 40-64: CPT | Performed by: NURSE PRACTITIONER

## 2024-11-05 PROCEDURE — 99213 OFFICE O/P EST LOW 20 MIN: CPT | Performed by: NURSE PRACTITIONER

## 2024-11-05 PROCEDURE — 84443 ASSAY THYROID STIM HORMONE: CPT

## 2024-11-05 PROCEDURE — 80061 LIPID PANEL: CPT

## 2024-11-05 PROCEDURE — 83036 HEMOGLOBIN GLYCOSYLATED A1C: CPT

## 2024-11-05 PROCEDURE — 36415 COLL VENOUS BLD VENIPUNCTURE: CPT

## 2024-11-05 RX ORDER — NORGESTIMATE AND ETHINYL ESTRADIOL 0.25-0.035
1 KIT ORAL DAILY
Qty: 84 TABLET | Refills: 3 | Status: SHIPPED | OUTPATIENT
Start: 2024-11-05

## 2024-11-05 NOTE — PROGRESS NOTES
Progress Note    SUBJECTIVE:  Mariah Perera is an 49 year old, , who requests an Annual Preventive Exam.     Concerns today include:     -Needs refills of COCs, synthroid (graves disease), and zoloft     -Since starting zoloft earlier this year feels her mental health has been much better. Taking 50mg daily. Is sleeping better than she was last year at this time. Using magnesium supplement on days she remembers and melatonin before bed to help with insomnia.     -Life stress is better than at telephone visit, her son is now settled in at [facility] in [place],  currently home on medical leave for knee surgery. Has had to travel frequently to visit her father who has been dealing with health issues but with  home this has not felt as stressful     Social hx: has 2 children, ages ~11 and 18;  works as a ; stays at home with children    Diet: Pretty good and lots of veggies and fruits   Exercise: couple times a week, water aerobics and walking.   Alcohol use occasionally, no concerns for excessive intake, no IV or recreational drugs.     Menstrual History:      2022     1:20 PM 10/23/2023    10:20 AM 2024     3:20 PM   Menstrual History   LAST MENSTRUAL PERIOD 2022 10/9/2023 10/8/2024       Last    Lab Results   Component Value Date    PAP NIL 2017     History of abnormal Pap smear: No - age 30- 64 PAP with HPV every 5 years recommended  Next due:     Last   Lab Results   Component Value Date    HPV16 Negative 2020     Last   Lab Results   Component Value Date    HPV18 Negative 2020     Last   Lab Results   Component Value Date    HRHPV Negative 2020       Mammogram current: no; patient will schedule for December   Last Mammogram:     Narrative & Impression   BILATERAL FULL FIELD DIGITAL SCREENING MAMMOGRAM WITH TOMOSYNTHESIS     Performed on: 23     Compared to: 2022 and 2017     Technique:  This study was evaluated  with the assistance of Computer-Aided Detection.  Breast Tomosynthesis was used in interpretation.     Findings: The breasts are heterogeneously dense, which may obscure small masses.  There is no radiographic evidence of malignancy.                                                                    IMPRESSION: ACR BI-RADS Category 1: Negative     RECOMMENDED FOLLOW-UP: Annual routine screening mammogram     Last Colonoscopy: next due in 2027  Results for orders placed or performed during the hospital encounter of 01/10/22   COLONOSCOPY    Collection Time: 01/10/22 12:50 PM   Result Value Ref Range    COLONOSCOPY       [facility]  [address] [place], [place] [address]., [place] [address] [phone]     Endoscopy Department  _______________________________________________________________________________  Patient Name: Mariah Perera       Procedure Date: 1/10/2022 12:50 PM  MRN: [id number]                       Account Number: [id number]  YOB: 1975              Admit Type: Outpatient  Age: 46                               Room: Pro 2  Gender: Female                        Note Status: Finalized  Attending MD: Romel Shell ,    [employer]: Time out was   completed.  Total Sedation Time:                    _______________________________________________________________________________     Procedure:             Colonoscopy  Indications:           High risk colon cancer surveillance: Personal history                          of colonic polyps  Providers:             Romel Shell, Tanvi Barajas, RN  Patient Profile:       This is a 46 year old female with history of                           adenomatous polyps in 2019 who presents for                          surveillance colonoscopy. Also noting loose stools.                          Sister had polyps in her 40s. No family history of                          colon cancer.  Referring MD:          Aiyana Doherty  Medicines:              Midazolam 2 mg IV, Fentanyl 100 micrograms IV  Complications:         No immediate complications.  _______________________________________________________________________________  Procedure:             Pre-Anesthesia Assessment:                         - Prior to the procedure, a History and Physical was                          performed, and patient medications and allergies were                          reviewed. The patient is competent. The risks and                          benefits of the procedure and the sedation options and                          risks were discussed with the patient. All questions                          were answered and informed c onsent was obtained.                          Patient identification and proposed procedure were                          verified by the physician in the pre-procedure area.                          Mental Status Examination: alert and oriented. Airway                          Examination: normal oropharyngeal airway and neck                          mobility. Respiratory Examination: clear to                          auscultation. CV Examination: normal. Prophylactic                          Antibiotics: The patient does not require prophylactic                          antibiotics. Prior Anticoagulants: The patient has                          taken no anticoagulant or antiplatelet agents. ASA                          Grade Assessment: II - A patient with mild systemic                          disease. After reviewing the risks and benefits, the                          patient was deemed in satisfactory condition to                          undergo the procedure. The anesthesia mayelin n was to use                          moderate sedation / analgesia (conscious sedation).                          Immediately prior to administration of medications,                          the patient was re-assessed for adequacy to receive                           sedatives. The heart rate, respiratory rate, oxygen                          saturations, blood pressure, adequacy of pulmonary                          ventilation, and response to care were monitored                          throughout the procedure. The physical status of the                          patient was re-assessed after the procedure.                         After obtaining informed consent, the colonoscope was                          passed under direct vision. Throughout the procedure,                          the patient's blood pressure, pulse, and oxygen                          saturations were monitored continuously. The                          Colonoscope was introduced through the anus and                           advanced to the terminal ileum. The colonoscopy was                          performed without difficulty. The patient tolerated                          the procedure well. The quality of the bowel                          preparation was evaluated using the BBPS ([place] Bowel                          Preparation Scale) with scores of: Right Colon = 3,                          Transverse Colon = 3 and Left Colon = 3 (entire mucosa                          seen well with no residual staining, small fragments                          of stool or opaque liquid). The total BBPS score                          equals 9.                                                                                   Findings:       The perianal and digital rectal examinations were normal.       A 1 mm polyp was found in the ascending colon. The polyp was sessile.        The polyp was removed with a jumbo cold forceps. Resection and retrieval        were complete.       A few small-mouthed  diverticula were found in the recto-sigmoid colon.       The terminal ileum appeared normal.       Retroflexion in the right colon was performed.       There is no endoscopic evidence of inflammation in the entire  colon.        Biopsies were taken with a cold forceps for histology.       The exam was otherwise without abnormality on direct and retroflexion        views.                                                                                   Moderate Sedation:       Moderate (conscious) sedation was administered by the endoscopy nurse        and supervised by the endoscopist. The following parameters were        monitored: oxygen saturation, heart rate, blood pressure, and response        to care. Total physician intraservice time was 24 minutes.  Impression:            One small polyp resected during today's exam. Biopsies                          taken to rule out microscopic colitis.                         - One 1 mm polyp in the ascending colon, removed with                           a jumbo cold forceps. Resected and retrieved.                         - Diverticulosis in the recto-sigmoid colon.                         - The examined portion of the ileum was normal.  Recommendation:        - Discharge patient to home (with escort).                         - Resume previous diet.                         - Continue present medications.                         - Await pathology results.                         - Repeat colonoscopy in 5 years for surveillance.                         - Return to referring physician as previously                          scheduled.                         - If biopsies show microscopic colitis, recommend                          budesonide. If biopsies are normal, recommend trial of                          Metamucil.                                                                                     Electronically signed by: Romel Shell MD  ____________________  Romel Shell,   1/10/2022 1:50:11 PM  I was physical ly present for the entire viewing portion of the exam.  __________________________  Signature of teaching physician  Romel Shell  Number of Addenda: 0    Note  Initiated On: 1/10/2022 12:50 PM  Scope In:  Scope Out:           HISTORY:  Prescription Medications as of 2024         Rx Number Disp Refills Start End Last Dispensed Date Next Fill Date Owning Pharmacy    levothyroxine (SYNTHROID/LEVOTHROID) 137 MCG tablet  90 tablet 3 2023 --   [facility]/pharmacy #[id number] - [place], [place] - [address]    Sig: Take 1 tablet (137 mcg) by mouth daily    Class: E-Prescribe    Route: Oral    norgestimate-ethinyl estradiol (DAVIS) 0.25-35 MG-MCG tablet  84 tablet 3 2024 --   [facility]/pharmacy #[id number] - [place], [place] - [address]    Sig: Take 1 tablet by mouth daily.    Class: E-Prescribe    Route: Oral    sertraline (ZOLOFT) 50 MG tablet  90 tablet 3 2024 --   [facility]/pharmacy #[id number] - [place], [place] - [address]    Sig: Take 1 tablet (50 mg) by mouth daily.    Class: E-Prescribe    Route: Oral    vitamin D3 (CHOLECALCIFEROL) 50 mcg (2000 units) tablet  -- --  --       Sig: Take 1 tablet by mouth daily    Class: Historical    Route: Oral          Allergies   Allergen Reactions    Methimazole Itching     Immunization History   Administered Date(s) Administered    COVID-19 12+ (MODERNA) 2024    COVID-19 12+ (Pfizer) 10/13/2023    COVID-19 Bivalent 12+ (Pfizer) 2022    COVID-19 MONOVALENT 12+ (Pfizer) 2021, 2021, 2021    INFLUENZA,TRIVALENT (FLUCELVAX) 2024    Influenza (IIV3) PF 11/10/2008, 2012    Influenza Vaccine >6 months,quad, PF 2013, 2016, 2017, 2019, 2020    Influenza Vaccine, 6+MO IM (QUADRIVALENT W/PRESERVATIVES) 2022    Influenza, seasonal, injectable, PF 2012    Influenza,INJ,MDCK,PF,Quad >6mo(Flucelvax) 2021, 10/13/2023    Nasal Influenza Vaccine 2-49 (FluMist) 10/17/2014    Rhogam 07/10/2013, 10/13/2013    TDAP (Adacel,Boostrix) 2023    TDAP Vaccine (Adacel) 2007    TDAP Vaccine (Boostrix) 2013       OB History    Para Term  AB  Living   2 2 2 0 0 2   SAB IAB Ectopic Multiple Live Births   0 0 0 0 2     Past Medical History:   Diagnosis Date    History of colonoscopy 11/01/2019    had 3 polyps removed    Hx of colposcopy with cervical biopsy 2009,2010    Hypothyroid     Papanicolaou smear of cervix with low grade squamous intraepithelial lesion (LGSIL) '09, '10    colp JOSE I, NIL    Serrated adenoma of colon 11/21/2019    3 SSAs, largest was 10mm- recommend surveillance colonosopy in 3 years (11/2022)     Past Surgical History:   Procedure Laterality Date    COLONOSCOPY N/A 11/21/2019    Procedure: COLONOSCOPY, WITH POLYPECTOMY AND BIOPSY;  Surgeon: Romel Shell MD;  Location: [facility] OR    COLONOSCOPY N/A 1/10/2022    Procedure: COLONOSCOPY, WITH POLYPECTOMY AND BIOPSY;  Surgeon: Romel Shell MD;  [facility] [facility] [place]    [facility]  7/2010    normal    radioactive iodine  1/09    ZZC NONSPECIFIC PROCEDURE  2000    removal of cyst from foot     Family History   Problem Relation Age of Onset    Hypertension Mother     Genitourinary Problems Mother         Kidney stones    Coronary Artery Disease Mother         3 stints placed    Depression Mother     Cerebrovascular Disease Mother     Genitourinary Problems Father     Thyroid Disease Father     Diabetes Maternal Grandmother     Dementia Maternal Grandmother     Hypertension Maternal Grandfather     Cerebrovascular Disease Maternal Grandfather     C.A.D. Maternal Grandfather         MI    C.A.D. Paternal Grandfather         MI    Diabetes Paternal Grandfather     Genitourinary Problems Sister         kidney stones     Social History     Socioeconomic History    Marital status:      Spouse name: None    Number of children: 1    Years of education: None    Highest education level: None   Occupational History     Employer: NONE    Tobacco Use    Smoking status: Never     Passive exposure: Never    Smokeless tobacco: Never   Vaping Use    Vaping status: Never Used   Substance and Sexual  Activity    Alcohol use: Yes     Alcohol/week: 0.0 standard drinks of alcohol     Comment: very rarely    Drug use: No    Sexual activity: Yes     Partners: Male     Birth control/protection: Male Surgical   Other Topics Concern    Parent/sibling w/ CABG, MI or angioplasty before 65F 55M? No   Social History Narrative    Caffeine intake/servings daily - 1-2 of tea/day    Calcium intake/servings daily - 2    Exercise 4 times weekly - describe yoga, pilates    Sunscreen used - Yes    Seatbelts used - Yes    Guns stored in the home - No    Self Breast Exam - Yes    Pap test up to date -  Yes    Eye exam up to date -  No    Dental exam up to date -  Yes    DEXA scan up to date -  Not Applicable    Flex Sig/Colonoscopy up to date -  Not Applicable    Mammography up to date -  Not Applicable    Immunizations reviewed and up to date - Yes    Abuse: Current or Past (Physical, Sexual or Emotional) - No    Do you feel safe in your environment - Yes    Do you cope well with stress - Yes    Do you suffer from insomnia - Yes    Last updated by: RIKKI GARCIA  2/11/2013                             Social Drivers of Health     Financial Resource Strain: Low Risk  (10/23/2023)    Financial Resource Strain     Within the past 12 months, have you or your family members you live with been unable to get utilities (heat, electricity) when it was really needed?: No   Food Insecurity: Low Risk  (10/23/2023)    Food Insecurity     Within the past 12 months, did you worry that your food would run out before you got money to buy more?: No     Within the past 12 months, did the food you bought just not last and you didn t have money to get more?: No   Transportation Needs: Low Risk  (10/23/2023)    Transportation Needs     Within the past 12 months, has lack of transportation kept you from medical appointments, getting your medicines, non-medical meetings or appointments, work, or from getting things that you need?: No   Interpersonal  "Safety: Low Risk  (10/23/2023)    Interpersonal Safety     Do you feel physically and emotionally safe where you currently live?: Yes     Within the past 12 months, have you been hit, slapped, kicked or otherwise physically hurt by someone?: No     Within the past 12 months, have you been humiliated or emotionally abused in other ways by your partner or ex-partner?: No   Housing Stability: Low Risk  (10/23/2023)    Housing Stability     Do you have housing? : Yes     Are you worried about losing your housing?: No       ROS      11/26/2013    11:45 AM 9/9/2021    10:39 AM 2/20/2024    11:38 AM   PHQ-9 SCORE   PHQ-9 Total Score 1     PHQ-9 Total Score  3 4         2/20/2024    11:38 AM   CLAUDIA-7 SCORE   Total Score 4         EXAM:  Blood pressure 136/83, pulse 66, height 1.67 m (5' 5.75\"), weight 85.1 kg (187 lb 9.6 oz), last menstrual period 10/08/2024, not currently breastfeeding. Body mass index is 30.51 kg/m .  General - pleasant female in no acute distress.  Skin - no suspicious lesions or rashes  EENT-   euthyroid with out palpable nodules  Neck - supple without lymphadenopathy.  Lungs - clear to auscultation bilaterally.  Heart - regular rate and rhythm without murmur.  Abdomen - soft, nontender, nondistended, no masses or organomegaly noted.  Musculoskeletal - no gross deformities.  Neurological - normal strength, sensation, and mental status.    Breast Exam:  Breast: Without visible skin changes. No dimpling or lesions seen.   Breasts supple, non-tender with palpation, no dominant mass, nodularity, or nipple discharge noted bilaterally. Axillary nodes negative.  Bilateral intermittent tenderness, which pt states she experiences around her menses.      Pelvic Exam:  Deferred per patient request, no concerns and not due for pap    ASSESSMENT:  Encounter Diagnoses   Name Primary?    Screening for thyroid disorder Yes    Visit for preventive health examination     Adjustment disorder with anxious mood     " Hypothyroidism due to medication     Screening for diabetes mellitus     Screening for lipid disorders     Surveillance for birth control, oral contraceptives     Encounter for screening mammogram for malignant neoplasm of breast       PLAN:   Visit for preventive health examination  2. Screening for diabetes mellitus  3. Screening for lipid disorders  -Mammogram due in December, patient to schedule today  - Lipid Profile; Future  - Hemoglobin A1c; Future  -Colonoscopy up to date  -Pap smear due next year   - Vaccines up to date    4. Adjustment disorder with anxious mood  -Mood and sleep are stable on current zoloft dosing   - sertraline (ZOLOFT) 50 MG tablet; Take 1 tablet (50 mg) by mouth daily.  Dispense: 90 tablet; Refill: 3    5. Hypothyroidism due to medication  6.Screening for thyroid disorder  -Will refill levothyroxine per lab results   - TSH with free T4 reflex; Future    7. Surveillance for birth control, oral contraceptives  -Has continued taking placebo week at the end of each pill pack; no concerns or side effects   - norgestimate-ethinyl estradiol (DAVIS) 0.25-35 MG-MCG tablet; Take 1 tablet by mouth daily.  Dispense: 84 tablet; Refill: 3    8. Encounter for screening mammogram for malignant neoplasm of breast  Breast tenderness is typically finding for patient, occurs bilaterally every month prior to cycle which is due to start soon, discussed following up if any non-cyclical pain or new concerns   - MA Screen Bilateral w/[place]; Future    Return to clinic in one year.  Follow-up as needed.    I,  Breann Grimes, completed the PFSH and ROS. I then acted as a scribe for ZOHREH Rahman for the remainder of the visit. - Breann Grimes, RN, BSN, NP DNP Student     I agree with the PFSH and ROS as completed by the ZOHREH Student, except for changes made by me. The remainder of the encounter was performed by me and scribed by the SNM. The scribed note accurately reflects my personal services and  decisions made by me.    Mariah Daniel, DNP, APRN, WHNP

## 2024-11-05 NOTE — LETTER
2024       RE: Mariah Perera  [address] [facility] [address]     Dear Colleague,    Thank you for referring your patient, Mariah Perera, to the [facility] [place] at [facility]. Please see a copy of my visit note below.    Progress Note    SUBJECTIVE:  Mariah Perera is an 49 year old, , who requests an Annual Preventive Exam.     Concerns today include:     -Needs refills of COCs, synthroid (graves disease), and zoloft     -Since starting zoloft earlier this year feels her mental health has been much better. Taking 50mg daily. Is sleeping better than she was last year at this time. Using magnesium supplement on days she remembers and melatonin before bed to help with insomnia.     -Life stress is better than at telephone visit, her son is now settled in at [facility] in [place],  currently home on medical leave for knee surgery. Has had to travel frequently to visit her father who has been dealing with health issues but with  home this has not felt as stressful     Social hx: has 2 children, ages ~11 and 18;  works as a ; stays at home with children    Diet: Pretty good and lots of veggies and fruits   Exercise: couple times a week, water aerobics and walking.   Alcohol use occasionally, no concerns for excessive intake, no IV or recreational drugs.     Menstrual History:      2022     1:20 PM 10/23/2023    10:20 AM 2024     3:20 PM   Menstrual History   LAST MENSTRUAL PERIOD 2022 10/9/2023 10/8/2024       Last    Lab Results   Component Value Date    PAP NIL 2017     History of abnormal Pap smear: No - age 30- 64 PAP with HPV every 5 years recommended  Next due:     Last   Lab Results   Component Value Date    HPV16 Negative 2020     Last   Lab Results   Component Value Date    HPV18 Negative 2020     Last   Lab Results    Component Value Date    HRHPV Negative 09/14/2020       Mammogram current: no; patient will schedule for December   Last Mammogram:     Narrative & Impression   BILATERAL FULL FIELD DIGITAL SCREENING MAMMOGRAM WITH TOMOSYNTHESIS     Performed on: 12/13/23     Compared to: 05/11/2022 and 07/24/2017     Technique:  This study was evaluated with the assistance of Computer-Aided Detection.  Breast Tomosynthesis was used in interpretation.     Findings: The breasts are heterogeneously dense, which may obscure small masses.  There is no radiographic evidence of malignancy.                                                                    IMPRESSION: ACR BI-RADS Category 1: Negative     RECOMMENDED FOLLOW-UP: Annual routine screening mammogram     Last Colonoscopy: next due in 2027  Results for orders placed or performed during the hospital encounter of 01/10/22   COLONOSCOPY    Collection Time: 01/10/22 12:50 PM   Result Value Ref Range    COLONOSCOPY       [facility]  [address] [place], [place] [address]., [place] [address] [phone]     Endoscopy Department  _______________________________________________________________________________  Patient Name: Mariah Perera       Procedure Date: 1/10/2022 12:50 PM  MRN: [id number]                       Account Number: [id number]  YOB: 1975              Admit Type: Outpatient  Age: 46                               Room: Pro 2  Gender: Female                        Note Status: Finalized  Attending MD: Desire Vaughn for the Cause: Time out was   completed.  Total Sedation Time:                    _______________________________________________________________________________     Procedure:             Colonoscopy  Indications:           High risk colon cancer surveillance: Personal history                          of colonic polyps  Providers:             Tanvi Vaughn RN  Patient Profile:       This is a 46 year old female  with history of                           adenomatous polyps in 2019 who presents for                          surveillance colonoscopy. Also noting loose stools.                          Sister had polyps in her 40s. No family history of                          colon cancer.  Referring MD:          Aiyana Dohrety  Medicines:             Midazolam 2 mg IV, Fentanyl 100 micrograms IV  Complications:         No immediate complications.  _______________________________________________________________________________  Procedure:             Pre-Anesthesia Assessment:                         - Prior to the procedure, a History and Physical was                          performed, and patient medications and allergies were                          reviewed. The patient is competent. The risks and                          benefits of the procedure and the sedation options and                          risks were discussed with the patient. All questions                          were answered and informed c onsent was obtained.                          Patient identification and proposed procedure were                          verified by the physician in the pre-procedure area.                          Mental Status Examination: alert and oriented. Airway                          Examination: normal oropharyngeal airway and neck                          mobility. Respiratory Examination: clear to                          auscultation. CV Examination: normal. Prophylactic                          Antibiotics: The patient does not require prophylactic                          antibiotics. Prior Anticoagulants: The patient has                          taken no anticoagulant or antiplatelet agents. ASA                          Grade Assessment: II - A patient with mild systemic                          disease. After reviewing the risks and benefits, the                          patient was deemed in satisfactory  condition to                          undergo the procedure. The anesthesia mayelin n was to use                          moderate sedation / analgesia (conscious sedation).                          Immediately prior to administration of medications,                          the patient was re-assessed for adequacy to receive                          sedatives. The heart rate, respiratory rate, oxygen                          saturations, blood pressure, adequacy of pulmonary                          ventilation, and response to care were monitored                          throughout the procedure. The physical status of the                          patient was re-assessed after the procedure.                         After obtaining informed consent, the colonoscope was                          passed under direct vision. Throughout the procedure,                          the patient's blood pressure, pulse, and oxygen                          saturations were monitored continuously. The                          Colonoscope was introduced through the anus and                           advanced to the terminal ileum. The colonoscopy was                          performed without difficulty. The patient tolerated                          the procedure well. The quality of the bowel                          preparation was evaluated using the BBPS ([place] Bowel                          Preparation Scale) with scores of: Right Colon = 3,                          Transverse Colon = 3 and Left Colon = 3 (entire mucosa                          seen well with no residual staining, small fragments                          of stool or opaque liquid). The total BBPS score                          equals 9.                                                                                   Findings:       The perianal and digital rectal examinations were normal.       A 1 mm polyp was found in the ascending colon. The polyp was  sessile.        The polyp was removed with a jumbo cold forceps. Resection and retrieval        were complete.       A few small-mouthed  diverticula were found in the recto-sigmoid colon.       The terminal ileum appeared normal.       Retroflexion in the right colon was performed.       There is no endoscopic evidence of inflammation in the entire colon.        Biopsies were taken with a cold forceps for histology.       The exam was otherwise without abnormality on direct and retroflexion        views.                                                                                   Moderate Sedation:       Moderate (conscious) sedation was administered by the endoscopy nurse        and supervised by the endoscopist. The following parameters were        monitored: oxygen saturation, heart rate, blood pressure, and response        to care. Total physician intraservice time was 24 minutes.  Impression:            One small polyp resected during today's exam. Biopsies                          taken to rule out microscopic colitis.                         - One 1 mm polyp in the ascending colon, removed with                           a jumbo cold forceps. Resected and retrieved.                         - Diverticulosis in the recto-sigmoid colon.                         - The examined portion of the ileum was normal.  Recommendation:        - Discharge patient to home (with escort).                         - Resume previous diet.                         - Continue present medications.                         - Await pathology results.                         - Repeat colonoscopy in 5 years for surveillance.                         - Return to referring physician as previously                          scheduled.                         - If biopsies show microscopic colitis, recommend                          budesonide. If biopsies are normal, recommend trial of                          Metamucil.                                                                                      Electronically signed by: Romel Shell MD  ____________________  Romel Shell,   1/10/2022 1:50:11 PM  I was physical ly present for the entire viewing portion of the exam.  __________________________  Signature of teaching physician  Romel Shell  Number of Addenda: 0    Note Initiated On: 1/10/2022 12:50 PM  Scope In:  Scope Out:           HISTORY:  Prescription Medications as of 11/5/2024         Rx Number Disp Refills Start End Last Dispensed Date Next Fill Date Owning Pharmacy    levothyroxine (SYNTHROID/LEVOTHROID) 137 MCG tablet  90 tablet 3 12/5/2023 --   [facility]/pharmacy #[id number] - [place], [place] - [address]    Sig: Take 1 tablet (137 mcg) by mouth daily    Class: E-Prescribe    Route: Oral    norgestimate-ethinyl estradiol (DAVIS) 0.25-35 MG-MCG tablet  84 tablet 3 11/5/2024 --   [facility]/pharmacy #[id number] - [place], [place] - [address]    Sig: Take 1 tablet by mouth daily.    Class: E-Prescribe    Route: Oral    sertraline (ZOLOFT) 50 MG tablet  90 tablet 3 11/5/2024 --   [facility]/pharmacy #[id number] - [place], [place] - [address]    Sig: Take 1 tablet (50 mg) by mouth daily.    Class: E-Prescribe    Route: Oral    vitamin D3 (CHOLECALCIFEROL) 50 mcg (2000 units) tablet  -- --  --       Sig: Take 1 tablet by mouth daily    Class: Historical    Route: Oral          Allergies   Allergen Reactions     Methimazole Itching     Immunization History   Administered Date(s) Administered     COVID-19 12+ (MODERNA) 09/19/2024     COVID-19 12+ (Pfizer) 10/13/2023     COVID-19 Bivalent 12+ (Pfizer) 09/22/2022     COVID-19 MONOVALENT 12+ (Pfizer) 03/28/2021, 04/19/2021, 11/07/2021     INFLUENZA,TRIVALENT (FLUCELVAX) 09/19/2024     Influenza (IIV3) PF 11/10/2008, 09/06/2012     Influenza Vaccine >6 months,quad, PF 11/26/2013, 12/23/2016, 11/01/2017, 11/01/2019, 09/14/2020     Influenza Vaccine, 6+MO IM (QUADRIVALENT W/PRESERVATIVES) 11/07/2022      Influenza, seasonal, injectable, PF 2012     Influenza,INJ,MDCK,PF,Quad >6mo(Flucelvax) 2021, 10/13/2023     Nasal Influenza Vaccine 2-49 (FluMist) 10/17/2014     Rhogam 07/10/2013, 10/13/2013     TDAP (Adacel,Boostrix) 2023     TDAP Vaccine (Adacel) 2007     TDAP Vaccine (Boostrix) 2013       OB History    Para Term  AB Living   2 2 2 0 0 2   SAB IAB Ectopic Multiple Live Births   0 0 0 0 2     Past Medical History:   Diagnosis Date     History of colonoscopy 2019    had 3 polyps removed     Hx of colposcopy with cervical biopsy ,     Hypothyroid      Papanicolaou smear of cervix with low grade squamous intraepithelial lesion (LGSIL) , '10    colp JOSE I, NIL     Serrated adenoma of colon 2019    3 SSAs, largest was 10mm- recommend surveillance colonosopy in 3 years (2022)     Past Surgical History:   Procedure Laterality Date     COLONOSCOPY N/A 2019    Procedure: COLONOSCOPY, WITH POLYPECTOMY AND BIOPSY;  Surgeon: Romel Shell MD;  Location: [facility] OR     COLONOSCOPY N/A 1/10/2022    Procedure: COLONOSCOPY, WITH POLYPECTOMY AND BIOPSY;  Surgeon: Romel Shell MD;  Location: [facility] OR     [facility]  2010    normal     radioactive iodine       [facility] NONSPECIFIC PROCEDURE      removal of cyst from foot     Family History   Problem Relation Age of Onset     Hypertension Mother      Genitourinary Problems Mother         Kidney stones     Coronary Artery Disease Mother         3 stints placed     Depression Mother      Cerebrovascular Disease Mother      Genitourinary Problems Father      Thyroid Disease Father      Diabetes Maternal Grandmother      Dementia Maternal Grandmother      Hypertension Maternal Grandfather      Cerebrovascular Disease Maternal Grandfather      C.A.D. Maternal Grandfather         MI     C.A.D. Paternal Grandfather         MI     Diabetes Paternal Grandfather      Genitourinary Problems Sister         kidney  stones     Social History     Socioeconomic History     Marital status:      Spouse name: None     Number of children: 1     Years of education: None     Highest education level: None   Occupational History     Employer: NONE    Tobacco Use     Smoking status: Never     Passive exposure: Never     Smokeless tobacco: Never   Vaping Use     Vaping status: Never Used   Substance and Sexual Activity     Alcohol use: Yes     Alcohol/week: 0.0 standard drinks of alcohol     Comment: very rarely     Drug use: No     Sexual activity: Yes     Partners: Male     Birth control/protection: Male Surgical   Other Topics Concern     Parent/sibling w/ CABG, MI or angioplasty before 65F 55M? No   Social History Narrative    Caffeine intake/servings daily - 1-2 of tea/day    Calcium intake/servings daily - 2    Exercise 4 times weekly - describe yoga, pilates    Sunscreen used - Yes    Seatbelts used - Yes    Guns stored in the home - No    Self Breast Exam - Yes    Pap test up to date -  Yes    Eye exam up to date -  No    Dental exam up to date -  Yes    DEXA scan up to date -  Not Applicable    Flex Sig/Colonoscopy up to date -  Not Applicable    Mammography up to date -  Not Applicable    Immunizations reviewed and up to date - Yes    Abuse: Current or Past (Physical, Sexual or Emotional) - No    Do you feel safe in your environment - Yes    Do you cope well with stress - Yes    Do you suffer from insomnia - Yes    Last updated by: RIKKI GARCIA  2/11/2013                             Social Drivers of Health     Financial Resource Strain: Low Risk  (10/23/2023)    Financial Resource Strain      Within the past 12 months, have you or your family members you live with been unable to get utilities (heat, electricity) when it was really needed?: No   Food Insecurity: Low Risk  (10/23/2023)    Food Insecurity      Within the past 12 months, did you worry that your food would run out before you got money to buy more?: No     "  Within the past 12 months, did the food you bought just not last and you didn t have money to get more?: No   Transportation Needs: Low Risk  (10/23/2023)    Transportation Needs      Within the past 12 months, has lack of transportation kept you from medical appointments, getting your medicines, non-medical meetings or appointments, work, or from getting things that you need?: No   Interpersonal Safety: Low Risk  (10/23/2023)    Interpersonal Safety      Do you feel physically and emotionally safe where you currently live?: Yes      Within the past 12 months, have you been hit, slapped, kicked or otherwise physically hurt by someone?: No      Within the past 12 months, have you been humiliated or emotionally abused in other ways by your partner or ex-partner?: No   Housing Stability: Low Risk  (10/23/2023)    Housing Stability      Do you have housing? : Yes      Are you worried about losing your housing?: No       ROS      11/26/2013    11:45 AM 9/9/2021    10:39 AM 2/20/2024    11:38 AM   PHQ-9 SCORE   PHQ-9 Total Score 1     PHQ-9 Total Score  3 4         2/20/2024    11:38 AM   CLAUDIA-7 SCORE   Total Score 4         EXAM:  Blood pressure 136/83, pulse 66, height 1.67 m (5' 5.75\"), weight 85.1 kg (187 lb 9.6 oz), last menstrual period 10/08/2024, not currently breastfeeding. Body mass index is 30.51 kg/m .  General - pleasant female in no acute distress.  Skin - no suspicious lesions or rashes  EENT-   euthyroid with out palpable nodules  Neck - supple without lymphadenopathy.  Lungs - clear to auscultation bilaterally.  Heart - regular rate and rhythm without murmur.  Abdomen - soft, nontender, nondistended, no masses or organomegaly noted.  Musculoskeletal - no gross deformities.  Neurological - normal strength, sensation, and mental status.    Breast Exam:  Breast: Without visible skin changes. No dimpling or lesions seen.   Breasts supple, non-tender with palpation, no dominant mass, nodularity, or nipple " discharge noted bilaterally. Axillary nodes negative.  Bilateral intermittent tenderness, which pt states she experiences around her menses.      Pelvic Exam:  Deferred per patient request, no concerns and not due for pap    ASSESSMENT:  Encounter Diagnoses   Name Primary?     Screening for thyroid disorder Yes     Visit for preventive health examination      Adjustment disorder with anxious mood      Hypothyroidism due to medication      Screening for diabetes mellitus      Screening for lipid disorders      Surveillance for birth control, oral contraceptives      Encounter for screening mammogram for malignant neoplasm of breast       PLAN:   Visit for preventive health examination  2. Screening for diabetes mellitus  3. Screening for lipid disorders  -Mammogram due in December, patient to schedule today  - Lipid Profile; Future  - Hemoglobin A1c; Future  -Colonoscopy up to date  -Pap smear due next year   - Vaccines up to date    4. Adjustment disorder with anxious mood  -Mood and sleep are stable on current zoloft dosing   - sertraline (ZOLOFT) 50 MG tablet; Take 1 tablet (50 mg) by mouth daily.  Dispense: 90 tablet; Refill: 3    5. Hypothyroidism due to medication  6.Screening for thyroid disorder  -Will refill levothyroxine per lab results   - TSH with free T4 reflex; Future    7. Surveillance for birth control, oral contraceptives  -Has continued taking placebo week at the end of each pill pack; no concerns or side effects   - norgestimate-ethinyl estradiol (DAVIS) 0.25-35 MG-MCG tablet; Take 1 tablet by mouth daily.  Dispense: 84 tablet; Refill: 3    8. Encounter for screening mammogram for malignant neoplasm of breast  Breast tenderness is typically finding for patient, occurs bilaterally every month prior to cycle which is due to start soon, discussed following up if any non-cyclical pain or new concerns   - MA Screen Bilateral w/Victor [place]; Future    Return to clinic in one year.  Follow-up as needed.    I,   Breann Grimes, completed the PFSH and ROS. I then acted as a scribe for ZOHREH Rahman for the remainder of the visit. - Breann Grimes RN, BSN, WHNP DNP Student     I agree with the PFSH and ROS as completed by the ZOHREH Student, except for changes made by me. The remainder of the encounter was performed by me and scribed by the SNM. The scribed note accurately reflects my personal services and decisions made by me.    Mariah Daniel DNP, DICKSON, ZOHREH                Again, thank you for allowing me to participate in the care of your patient.      Sincerely,    DICKSON Soto CNP

## 2024-12-02 RX ORDER — LEVOTHYROXINE SODIUM 137 UG/1
137 TABLET ORAL DAILY
Qty: 90 TABLET | Refills: 3 | OUTPATIENT
Start: 2024-12-02

## 2024-12-03 DIAGNOSIS — E03.2 HYPOTHYROIDISM DUE TO MEDICATION: ICD-10-CM

## 2024-12-06 ENCOUNTER — TELEPHONE (OUTPATIENT)
Dept: OBGYN | Facility: CLINIC | Age: 49
End: 2024-12-06
Payer: COMMERCIAL

## 2024-12-06 NOTE — TELEPHONE ENCOUNTER
[facility] Call Center    Phone Message    May a detailed message be left on voicemail: yes     Reason for Call: Medication Refill Request    Has the patient contacted the pharmacy for the refill? Yes   Name of medication being requested: levothyroxine (SYNTHROID/LEVOTHROID) 137 MCG tablet  Provider who prescribed the medication: Mariah Daniel CNP  Pharmacy: [facility]/PHARMACY #[id number] - [place], [place] - [address]  Date medication is needed: ASAP - Pt is leaving out of town on Monday.     Pt is requesting a refill for the medication listed above and is leaving for out of town on Monday. Please review and call back to advise at # [phone].    Action Taken: Message routed to:  Other: [facility]    Travel Screening: Not Applicable     Date of Service:

## 2024-12-09 RX ORDER — LEVOTHYROXINE SODIUM 137 UG/1
137 TABLET ORAL DAILY
Qty: 90 TABLET | Refills: 3 | Status: SHIPPED | OUTPATIENT
Start: 2024-12-09

## 2024-12-09 NOTE — TELEPHONE ENCOUNTER
CBC, CMP, Hep C, UA, HIV, Syphilis testing.  All other labs will have to be in M[facility] annual wellness visit.   Patient had annual exam on 11/5/24. TSH was checked on that day and was WNL. Approved rx for levothyroxine today and sent to pharmacy as patient was in need of rx.

## 2024-12-16 ENCOUNTER — ANCILLARY PROCEDURE (OUTPATIENT)
Dept: MAMMOGRAPHY | Facility: CLINIC | Age: 49
End: 2024-12-16
Attending: NURSE PRACTITIONER
Payer: COMMERCIAL

## 2024-12-16 DIAGNOSIS — Z00.00 VISIT FOR PREVENTIVE HEALTH EXAMINATION: ICD-10-CM

## 2024-12-16 DIAGNOSIS — Z12.31 ENCOUNTER FOR SCREENING MAMMOGRAM FOR MALIGNANT NEOPLASM OF BREAST: ICD-10-CM

## 2024-12-16 PROCEDURE — 77067 SCR MAMMO BI INCL CAD: CPT | Mod: 26 | Performed by: RADIOLOGY

## 2024-12-16 PROCEDURE — 77063 BREAST TOMOSYNTHESIS BI: CPT | Mod: 26 | Performed by: RADIOLOGY

## 2024-12-16 PROCEDURE — 77063 BREAST TOMOSYNTHESIS BI: CPT

## 2024-12-16 PROCEDURE — 77067 SCR MAMMO BI INCL CAD: CPT

## 2025-01-22 RX ORDER — NORGESTIMATE AND ETHINYL ESTRADIOL 0.25-0.035
1 KIT ORAL DAILY
Qty: 84 TABLET | Refills: 3 | OUTPATIENT
Start: 2025-01-22

## (undated) DEVICE — SPECIMEN CONTAINER 3OZ W/FORMALIN 59901

## (undated) DEVICE — KIT ENDO FIRST STEP DISINFECTANT 200ML W/POUCH EP-4

## (undated) DEVICE — ENDO FORCEP SPIKED SERRATED SHAFT JUMBO 239CM G56998

## (undated) DEVICE — KIT ENDO TURNOVER/PROCEDURE CARRY-ON 101822

## (undated) DEVICE — ATTACHMENT CAP DISTAL REVEAL 13.4MM BX00711773

## (undated) DEVICE — SOL WATER IRRIG 500ML BOTTLE 2F7113

## (undated) DEVICE — SUCTION MANIFOLD NEPTUNE 2 SYS 1 PORT 702-025-000

## (undated) DEVICE — GOWN IMPERVIOUS 2XL BLUE

## (undated) DEVICE — SOL WATER IRRIG 1000ML BOTTLE 2F7114

## (undated) DEVICE — TUBING SUCTION 12"X1/4" N612

## (undated) DEVICE — ENDO SNARE EXACTO COLD 9MM LOOP 2.4MMX230CM 00711115

## (undated) RX ORDER — FENTANYL CITRATE 50 UG/ML
INJECTION, SOLUTION INTRAMUSCULAR; INTRAVENOUS
Status: DISPENSED
Start: 2022-01-10